# Patient Record
Sex: MALE | URBAN - METROPOLITAN AREA
[De-identification: names, ages, dates, MRNs, and addresses within clinical notes are randomized per-mention and may not be internally consistent; named-entity substitution may affect disease eponyms.]

---

## 2024-02-09 ENCOUNTER — LAB REQUISITION (OUTPATIENT)
Dept: LAB | Facility: CLINIC | Age: 59
End: 2024-02-09

## 2024-02-09 DIAGNOSIS — E78.5 HYPERLIPIDEMIA, UNSPECIFIED: ICD-10-CM

## 2024-02-09 DIAGNOSIS — Z12.5 ENCOUNTER FOR SCREENING FOR MALIGNANT NEOPLASM OF PROSTATE: ICD-10-CM

## 2024-02-09 PROCEDURE — G0103 PSA SCREENING: HCPCS | Performed by: STUDENT IN AN ORGANIZED HEALTH CARE EDUCATION/TRAINING PROGRAM

## 2024-02-09 PROCEDURE — 80061 LIPID PANEL: CPT | Performed by: STUDENT IN AN ORGANIZED HEALTH CARE EDUCATION/TRAINING PROGRAM

## 2024-02-09 PROCEDURE — 80053 COMPREHEN METABOLIC PANEL: CPT | Performed by: STUDENT IN AN ORGANIZED HEALTH CARE EDUCATION/TRAINING PROGRAM

## 2024-02-10 LAB
ALBUMIN SERPL BCG-MCNC: 4.4 G/DL (ref 3.5–5.2)
ALP SERPL-CCNC: 88 U/L (ref 40–150)
ALT SERPL W P-5'-P-CCNC: 46 U/L (ref 0–70)
ANION GAP SERPL CALCULATED.3IONS-SCNC: 13 MMOL/L (ref 7–15)
AST SERPL W P-5'-P-CCNC: 34 U/L (ref 0–45)
BILIRUB SERPL-MCNC: 0.6 MG/DL
BUN SERPL-MCNC: 10.3 MG/DL (ref 6–20)
CALCIUM SERPL-MCNC: 9.5 MG/DL (ref 8.6–10)
CHLORIDE SERPL-SCNC: 104 MMOL/L (ref 98–107)
CHOLEST SERPL-MCNC: 136 MG/DL
CREAT SERPL-MCNC: 1.01 MG/DL (ref 0.67–1.17)
DEPRECATED HCO3 PLAS-SCNC: 24 MMOL/L (ref 22–29)
EGFRCR SERPLBLD CKD-EPI 2021: 86 ML/MIN/1.73M2
FASTING STATUS PATIENT QL REPORTED: ABNORMAL
GLUCOSE SERPL-MCNC: 124 MG/DL (ref 70–99)
HDLC SERPL-MCNC: 35 MG/DL
LDLC SERPL CALC-MCNC: 69 MG/DL
NONHDLC SERPL-MCNC: 101 MG/DL
POTASSIUM SERPL-SCNC: 4.3 MMOL/L (ref 3.4–5.3)
PROT SERPL-MCNC: 7.3 G/DL (ref 6.4–8.3)
PSA SERPL DL<=0.01 NG/ML-MCNC: 0.36 NG/ML (ref 0–3.5)
SODIUM SERPL-SCNC: 141 MMOL/L (ref 135–145)
TRIGL SERPL-MCNC: 159 MG/DL

## 2025-05-05 ENCOUNTER — HOSPITAL ENCOUNTER (INPATIENT)
Facility: CLINIC | Age: 60
LOS: 2 days | Discharge: HOME | End: 2025-05-08
Attending: HOSPITALIST | Admitting: HOSPITALIST
Payer: COMMERCIAL

## 2025-05-05 ENCOUNTER — APPOINTMENT (OUTPATIENT)
Dept: MRI IMAGING | Facility: CLINIC | Age: 60
End: 2025-05-05
Attending: HOSPITALIST
Payer: COMMERCIAL

## 2025-05-05 ENCOUNTER — APPOINTMENT (OUTPATIENT)
Dept: ULTRASOUND IMAGING | Facility: HOSPITAL | Age: 60
End: 2025-05-05
Attending: EMERGENCY MEDICINE
Payer: COMMERCIAL

## 2025-05-05 ENCOUNTER — HOSPITAL ENCOUNTER (EMERGENCY)
Facility: HOSPITAL | Age: 60
Discharge: ANOTHER HOSPITAL | End: 2025-05-05
Attending: EMERGENCY MEDICINE | Admitting: EMERGENCY MEDICINE
Payer: COMMERCIAL

## 2025-05-05 VITALS
DIASTOLIC BLOOD PRESSURE: 68 MMHG | TEMPERATURE: 98.5 F | BODY MASS INDEX: 34.8 KG/M2 | OXYGEN SATURATION: 97 % | RESPIRATION RATE: 20 BRPM | HEIGHT: 69 IN | HEART RATE: 81 BPM | SYSTOLIC BLOOD PRESSURE: 122 MMHG | WEIGHT: 235 LBS

## 2025-05-05 DIAGNOSIS — L03.114 CELLULITIS OF LEFT UPPER EXTREMITY: ICD-10-CM

## 2025-05-05 DIAGNOSIS — L03.114 CELLULITIS OF LEFT UPPER EXTREMITY: Primary | ICD-10-CM

## 2025-05-05 PROBLEM — L03.90 CELLULITIS: Status: ACTIVE | Noted: 2025-05-05

## 2025-05-05 LAB
ANION GAP SERPL CALCULATED.3IONS-SCNC: 14 MMOL/L (ref 7–15)
BUN SERPL-MCNC: 11.1 MG/DL (ref 8–23)
CALCIUM SERPL-MCNC: 9.5 MG/DL (ref 8.8–10.4)
CHLORIDE SERPL-SCNC: 104 MMOL/L (ref 98–107)
CK SERPL-CCNC: 149 U/L (ref 39–308)
CREAT SERPL-MCNC: 1.07 MG/DL (ref 0.67–1.17)
CRP SERPL-MCNC: 154.1 MG/L
EGFRCR SERPLBLD CKD-EPI 2021: 80 ML/MIN/1.73M2
ERYTHROCYTE [DISTWIDTH] IN BLOOD BY AUTOMATED COUNT: 13.6 % (ref 10–15)
GLUCOSE SERPL-MCNC: 199 MG/DL (ref 70–99)
HCO3 SERPL-SCNC: 22 MMOL/L (ref 22–29)
HCT VFR BLD AUTO: 41.9 % (ref 40–53)
HGB BLD-MCNC: 14.2 G/DL (ref 13.3–17.7)
MCH RBC QN AUTO: 31.8 PG (ref 26.5–33)
MCHC RBC AUTO-ENTMCNC: 33.9 G/DL (ref 31.5–36.5)
MCV RBC AUTO: 94 FL (ref 78–100)
PLATELET # BLD AUTO: 211 10E3/UL (ref 150–450)
POTASSIUM SERPL-SCNC: 3.8 MMOL/L (ref 3.4–5.3)
RBC # BLD AUTO: 4.46 10E6/UL (ref 4.4–5.9)
SODIUM SERPL-SCNC: 140 MMOL/L (ref 135–145)
WBC # BLD AUTO: 20.5 10E3/UL (ref 4–11)

## 2025-05-05 PROCEDURE — 250N000011 HC RX IP 250 OP 636: Performed by: HOSPITALIST

## 2025-05-05 PROCEDURE — 250N000011 HC RX IP 250 OP 636: Performed by: EMERGENCY MEDICINE

## 2025-05-05 PROCEDURE — 82550 ASSAY OF CK (CPK): CPT | Performed by: EMERGENCY MEDICINE

## 2025-05-05 PROCEDURE — 90471 IMMUNIZATION ADMIN: CPT | Performed by: HOSPITALIST

## 2025-05-05 PROCEDURE — 255N000002 HC RX 255 OP 636: Performed by: HOSPITALIST

## 2025-05-05 PROCEDURE — A9585 GADOBUTROL INJECTION: HCPCS | Performed by: HOSPITALIST

## 2025-05-05 PROCEDURE — 86140 C-REACTIVE PROTEIN: CPT | Performed by: EMERGENCY MEDICINE

## 2025-05-05 PROCEDURE — 36415 COLL VENOUS BLD VENIPUNCTURE: CPT | Performed by: EMERGENCY MEDICINE

## 2025-05-05 PROCEDURE — 82310 ASSAY OF CALCIUM: CPT | Performed by: HOSPITALIST

## 2025-05-05 PROCEDURE — 73223 MRI JOINT UPR EXTR W/O&W/DYE: CPT | Mod: LT

## 2025-05-05 PROCEDURE — 93971 EXTREMITY STUDY: CPT | Mod: LT

## 2025-05-05 PROCEDURE — 258N000003 HC RX IP 258 OP 636: Performed by: HOSPITALIST

## 2025-05-05 PROCEDURE — 99222 1ST HOSP IP/OBS MODERATE 55: CPT | Performed by: HOSPITALIST

## 2025-05-05 PROCEDURE — 90715 TDAP VACCINE 7 YRS/> IM: CPT | Performed by: HOSPITALIST

## 2025-05-05 PROCEDURE — 96365 THER/PROPH/DIAG IV INF INIT: CPT

## 2025-05-05 PROCEDURE — 99285 EMERGENCY DEPT VISIT HI MDM: CPT | Mod: 25

## 2025-05-05 PROCEDURE — 250N000013 HC RX MED GY IP 250 OP 250 PS 637: Performed by: HOSPITALIST

## 2025-05-05 PROCEDURE — 120N000001 HC R&B MED SURG/OB

## 2025-05-05 PROCEDURE — 85018 HEMOGLOBIN: CPT | Performed by: EMERGENCY MEDICINE

## 2025-05-05 RX ORDER — ATENOLOL 25 MG/1
25 TABLET ORAL DAILY
Status: DISCONTINUED | OUTPATIENT
Start: 2025-05-06 | End: 2025-05-08 | Stop reason: HOSPADM

## 2025-05-05 RX ORDER — ATORVASTATIN CALCIUM 80 MG/1
40 TABLET, FILM COATED ORAL DAILY
COMMUNITY
Start: 2025-04-21

## 2025-05-05 RX ORDER — ASPIRIN 81 MG/1
81 TABLET ORAL DAILY
COMMUNITY

## 2025-05-05 RX ORDER — VANCOMYCIN HYDROCHLORIDE 1 G/200ML
1000 INJECTION, SOLUTION INTRAVENOUS EVERY 12 HOURS
Status: DISCONTINUED | OUTPATIENT
Start: 2025-05-06 | End: 2025-05-07

## 2025-05-05 RX ORDER — LORATADINE 10 MG/1
10 TABLET ORAL DAILY PRN
COMMUNITY

## 2025-05-05 RX ORDER — ASPIRIN 81 MG/1
81 TABLET ORAL DAILY
Status: DISCONTINUED | OUTPATIENT
Start: 2025-05-06 | End: 2025-05-08 | Stop reason: HOSPADM

## 2025-05-05 RX ORDER — ATORVASTATIN CALCIUM 20 MG/1
TABLET, FILM COATED ORAL DAILY
Status: ON HOLD | COMMUNITY
End: 2025-05-05

## 2025-05-05 RX ORDER — ATENOLOL 25 MG/1
25 TABLET ORAL DAILY
COMMUNITY

## 2025-05-05 RX ORDER — SILDENAFIL 25 MG/1
25 TABLET, FILM COATED ORAL SEE ADMIN INSTRUCTIONS
COMMUNITY
Start: 2024-10-28

## 2025-05-05 RX ORDER — ONDANSETRON 2 MG/ML
4 INJECTION INTRAMUSCULAR; INTRAVENOUS EVERY 6 HOURS PRN
Status: DISCONTINUED | OUTPATIENT
Start: 2025-05-05 | End: 2025-05-08 | Stop reason: HOSPADM

## 2025-05-05 RX ORDER — GADOBUTROL 604.72 MG/ML
10 INJECTION INTRAVENOUS ONCE
Status: COMPLETED | OUTPATIENT
Start: 2025-05-05 | End: 2025-05-05

## 2025-05-05 RX ORDER — CLINDAMYCIN PHOSPHATE 600 MG/50ML
600 INJECTION, SOLUTION INTRAVENOUS ONCE
Status: COMPLETED | OUTPATIENT
Start: 2025-05-05 | End: 2025-05-05

## 2025-05-05 RX ORDER — ONDANSETRON 4 MG/1
4 TABLET, ORALLY DISINTEGRATING ORAL EVERY 6 HOURS PRN
Status: DISCONTINUED | OUTPATIENT
Start: 2025-05-05 | End: 2025-05-08 | Stop reason: HOSPADM

## 2025-05-05 RX ORDER — AMOXICILLIN 250 MG
2 CAPSULE ORAL 2 TIMES DAILY PRN
Status: DISCONTINUED | OUTPATIENT
Start: 2025-05-05 | End: 2025-05-08 | Stop reason: HOSPADM

## 2025-05-05 RX ORDER — COVID-19 ANTIGEN TEST
220 KIT MISCELLANEOUS 2 TIMES DAILY PRN
COMMUNITY

## 2025-05-05 RX ORDER — ACETAMINOPHEN 325 MG/1
650 TABLET ORAL EVERY 6 HOURS PRN
Status: DISCONTINUED | OUTPATIENT
Start: 2025-05-05 | End: 2025-05-08 | Stop reason: HOSPADM

## 2025-05-05 RX ORDER — AMOXICILLIN 250 MG
1 CAPSULE ORAL 2 TIMES DAILY PRN
Status: DISCONTINUED | OUTPATIENT
Start: 2025-05-05 | End: 2025-05-08 | Stop reason: HOSPADM

## 2025-05-05 RX ORDER — ATORVASTATIN CALCIUM 40 MG/1
40 TABLET, FILM COATED ORAL DAILY
Status: DISCONTINUED | OUTPATIENT
Start: 2025-05-06 | End: 2025-05-08 | Stop reason: HOSPADM

## 2025-05-05 RX ADMIN — GADOBUTROL 10 ML: 604.72 INJECTION INTRAVENOUS at 21:57

## 2025-05-05 RX ADMIN — CLINDAMYCIN PHOSPHATE 600 MG: 600 INJECTION, SOLUTION INTRAVENOUS at 14:34

## 2025-05-05 RX ADMIN — ACETAMINOPHEN 650 MG: 325 TABLET, FILM COATED ORAL at 20:46

## 2025-05-05 RX ADMIN — CLOSTRIDIUM TETANI TOXOID ANTIGEN (FORMALDEHYDE INACTIVATED), CORYNEBACTERIUM DIPHTHERIAE TOXOID ANTIGEN (FORMALDEHYDE INACTIVATED), BORDETELLA PERTUSSIS TOXOID ANTIGEN (GLUTARALDEHYDE INACTIVATED), BORDETELLA PERTUSSIS FILAMENTOUS HEMAGGLUTININ ANTIGEN (FORMALDEHYDE INACTIVATED), BORDETELLA PERTUSSIS PERTACTIN ANTIGEN, AND BORDETELLA PERTUSSIS FIMBRIAE 2/3 ANTIGEN 0.5 ML: 5; 2; 2.5; 5; 3; 5 INJECTION, SUSPENSION INTRAMUSCULAR at 22:09

## 2025-05-05 RX ADMIN — VANCOMYCIN HYDROCHLORIDE 2000 MG: 5 INJECTION, POWDER, LYOPHILIZED, FOR SOLUTION INTRAVENOUS at 19:58

## 2025-05-05 ASSESSMENT — COLUMBIA-SUICIDE SEVERITY RATING SCALE - C-SSRS
6. HAVE YOU EVER DONE ANYTHING, STARTED TO DO ANYTHING, OR PREPARED TO DO ANYTHING TO END YOUR LIFE?: NO
1. IN THE PAST MONTH, HAVE YOU WISHED YOU WERE DEAD OR WISHED YOU COULD GO TO SLEEP AND NOT WAKE UP?: NO
2. HAVE YOU ACTUALLY HAD ANY THOUGHTS OF KILLING YOURSELF IN THE PAST MONTH?: NO
2. HAVE YOU ACTUALLY HAD ANY THOUGHTS OF KILLING YOURSELF IN THE PAST MONTH?: NO
1. IN THE PAST MONTH, HAVE YOU WISHED YOU WERE DEAD OR WISHED YOU COULD GO TO SLEEP AND NOT WAKE UP?: NO
6. HAVE YOU EVER DONE ANYTHING, STARTED TO DO ANYTHING, OR PREPARED TO DO ANYTHING TO END YOUR LIFE?: NO

## 2025-05-05 ASSESSMENT — ACTIVITIES OF DAILY LIVING (ADL)
ADLS_ACUITY_SCORE: 41
ADLS_ACUITY_SCORE: 41
ADLS_ACUITY_SCORE: 18
ADLS_ACUITY_SCORE: 41
ADLS_ACUITY_SCORE: 18
ADLS_ACUITY_SCORE: 41
ADLS_ACUITY_SCORE: 18
ADLS_ACUITY_SCORE: 41

## 2025-05-05 NOTE — ED TRIAGE NOTES
Pt fell last Friday and hit his left arm.  His arm hurt so he went in on Sunday and had an xray which was negative for a fracture.  Yesterday at 3pm ,his left arm was sore,tingling and he had a fever.  This am when he woke up his arm was warm, red and swollen.  It hurts 7/10 if  the arm is touched..

## 2025-05-05 NOTE — ED NOTES
Patient is being transferred to [facility].  Will leave IV in because patient will need IV antibiotics.

## 2025-05-05 NOTE — PROGRESS NOTES
Triage note    [facility] ED provider requesting transfer for admission for management of cellulitis.  Reason for transfer is lack of bed availability at [facility].  Patient accepted in transfer to [facility].

## 2025-05-05 NOTE — MEDICATION SCRIBE - ADMISSION MEDICATION HISTORY
Medication Scribe Admission Medication History    Admission medication history is complete. The information provided in this note is only as accurate as the sources available at the time of the update.    Information Source(s): Patient, Family member, and CareEverywhere/SureScripts via in-person    Pertinent Information: Patient took all Rx medications this morning including aspirin 81 mg.     Changes made to PTA medication list:  Added:   Naproxen 220 mg  Sildenafil 25 mg  Loratadine 10 mg  Deleted: None  Changed:   Atorvastatin 20 mg --> 1/2 x 80 mg (40 mg)    Allergies reviewed with patient and updates made in EHR: yes    Medication History Completed By: Eliseo Brown 5/5/2025 6:44 PM    PTA Med List   Medication Sig Last Dose/Taking    aspirin 81 MG EC tablet Take 81 mg by mouth daily. 5/5/2025 Morning    atenolol (TENORMIN) 25 MG tablet Take 25 mg by mouth daily. 5/5/2025 Morning    atorvastatin (LIPITOR) 80 MG tablet Take 40 mg by mouth daily. 5/5/2025 Morning    loratadine (CLARITIN) 10 MG tablet Take 10 mg by mouth daily as needed for allergies. 5/4/2025 Morning    naproxen sodium 220 MG capsule Take 220 mg by mouth 2 times daily as needed (pain). 5/4/2025 Morning    sildenafil (VIAGRA) 25 MG tablet Take 25 mg by mouth See Admin Instructions. Take as directed by your prescriber. 5/3/2025

## 2025-05-05 NOTE — H&P
"[facility]    History and Physical - Hospitalist Service       Date of Admission:  5/5/2025    Assessment & Plan      Balaji Jc is a 59 year old male presenting with skin and soft tissue infection in the left upper extremity.  Concern for involvement of the left elbow or associated bursitis given exam.  Further evaluation needed with MRI.  For now, treat empirically with IV vancomycin.  Orthopaedic consult pending as well.    Regarding prior notation of \"allergy\" to tetanus vaccination, symptoms of fever and malaise are more consistent with a side effect rather than a hypersensitivity reaction.  Therefore, proceed with vaccination given laceration from sheet metal.    # LUE skin/soft tissue infection, concern for septic arthritis  - empiric vancomycin  - MRI L elbow  - ortho consult    # Left hand laceration  - Tdap vaccination    # Ectopy  - atenolol              Diet: Regular Diet Adult  NPO for Medical/Clinical Reasons Except for: Meds  Felix Catheter: Not present  Lines: None     Cardiac Monitoring: None  Code Status: No CPR- Do NOT Intubate    Clinically Significant Risk Factors Present on Admission                 # Drug Induced Platelet Defect: home medication list includes an antiplatelet medication             # Obesity: Estimated body mass index is 34.7 kg/m  as calculated from the following:    Height as of an earlier encounter on 5/5/25: 1.753 m (5' 9\").    Weight as of an earlier encounter on 5/5/25: 106.6 kg (235 lb).              Disposition Plan     Medically Ready for Discharge: Anticipated Tomorrow           Donis Kumar MD  Hospitalist Service  [facility]  Securely message with [employer] (more info)  Text page via [employer] Paging/Directory     ______________________________________________________________________    Chief Complaint   Left arm swelling    History is obtained from the patient and spouse    History of Present Illness   Balaji ALEMAN" Hosea is a 59 year old male who presents for evaluation of left arm swelling.  He was in his usual state of health until three days ago.  He fell off a ladder at that time and hit his left elbow.  He also cut his left hand with metal that same day.  Two days ago, he felt normal and was working.  Yesterday, he developed tingling and pain at the left elbow.  He had a fever to 101.6 as well.  Today, he developed a new rash from the left upper arm to the hand.  The rash has not gotten worse over the course of the day.  The swelling, however, has progressed over the course of the day.    Denies chest pain, chest pressure, dyspnea, nausea, or vomiting.  He went to [place] urgent care this morning and states he had radiographs taken at that time.    He reports having a prior reaction to a tetanus vaccination in 1995.  He had fever and malaise.  Denied associated dyspnea or rash.    I communicated directly with the ED provider at [facility] regarding this patient's presentation.    Past Medical History    No past medical history on file.    Past Surgical History   No past surgical history on file.    Prior to Admission Medications   Prior to Admission Medications   Prescriptions Last Dose Informant Patient Reported? Taking?   aspirin 81 MG EC tablet   Yes Yes   Sig: Take 81 mg by mouth daily.   atenolol (TENORMIN) 25 MG tablet   Yes Yes   Sig: Take 25 mg by mouth daily.   atorvastatin (LIPITOR) 20 MG tablet   Yes Yes   Sig: Take by mouth daily.      Facility-Administered Medications: None           Physical Exam   Vital Signs:                    Weight: 0 lbs 0 oz    Gen:  sitting in bed in no extremis  Neuro:  alert, conversant  CV:  nl rate, regular rhythm  Pulm:  no acute resp distress, ctab  MSK:  left upper extremity with extensive confluent erythema extending from the upper arm through the dorsum of the hand; there is not able swelling, warmth, and tenderness overlying the left elbow with associated fluid collection;  mild tenderness to palpation at the left wrist with patient demonstrating flexion/extension at the left wrist; healing laceration on palmar surface of left hand    Medical Decision Making             Data   Reviewed:    WBC 21  Hgb 14  Plts 211    LUE US  1.  No deep venous thrombosis in the left upper extremity.     2.  Moderate heterogeneous subcutaneous edema and/or hematoma formation, pronounced along the posterior forearm.

## 2025-05-05 NOTE — ED PROVIDER NOTES
EMERGENCY DEPARTMENT ENCOUNTER      NAME: Balaji Jc  AGE: 59 year old male  YOB: 1965  MRN: [id number]  EVALUATION DATE & TIME: 5/5/2025  1:08 PM    PCP: No primary care provider on file.    ED PROVIDER: Geraldo Schwartz M.D.      Chief Complaint   Patient presents with    Arm Pain         FINAL IMPRESSION:  1. Cellulitis of left upper extremity          ED COURSE & MEDICAL DECISION MAKING:    Pertinent Labs & Imaging studies reviewed. (See chart for details)  59 year old male presents to the Emergency Department for evaluation of left arm pain.  Patient reports falling from the bottom rung of the ladder onto a gravel surface on Friday.  Patient had slight immediate discomfort.  Was seen in urgent care and had x-rays performed which were negative.  He states that he has been using the arm regularly until today when it suddenly became more painful, swollen and reddened.  Patient denies any fevers or chills.  Exam reveals diffuse swelling of the left arm starting at the mid biceps area and extending distally.  Patient with induration along the posterior lateral aspect with erythema that is markedly tender proximally.  Elbow is supple without evidence of significant discomfort with movement.  No evidence joint infection.  Will obtain blood work and a inflammatory markers over concerns of infection.  Also obtained ultrasound over concerns of DVT.  Patient with ring in place which he removed immediately to avoid tourniquet effect.  Patient appears non toxic with stable vitals signs. Overall exam is benign.      1:11 PM I met with the patient for the initial interview and physical examination. Discussed plan for treatment and workup in the ED.    2:08 PM.  CRP markedly elevated 154.  White cell count elevated at 20.5.  CPK normal at 149.  Patient likely with significant cellulitic process.  Will initiate antibiotics and plan on hospitalization given rapid advancement.  2:40 PM.  Patient has bed available  at [facility].  Awaiting call from hospitalist.  Plan will be for patient to transfer by POV.  Ultrasound without evidence of DVT.  3:05 PM.  Patient discussed with my associate end of shift.  Patient awaiting transfer to [facility].  3:17 PM.  Patient accepted by Dr. Kumar the hospitalist at [facility].  At the conclusion of the encounter I discussed the results of all of the tests and the disposition. The questions were answered and return precautions provided. The patient or family acknowledged understanding and was agreeable with the care plan.         MEDICATIONS GIVEN IN THE EMERGENCY:  Medications   clindamycin (CLEOCIN) 600 mg in 50 mL D5W intermittent infusion (has no administration in time range)       NEW PRESCRIPTIONS STARTED AT TODAY'S ER VISIT  New Prescriptions    No medications on file          =================================================================    HPI      Balaji Jc is a 59 year old male with a pertient medical history of contact dermatitis, who presents to the ED for evaluation of left arm pain and swelling.    The patient reports that he was using a ladder and was going down the ladder, however, he notes that he forgot that the second to last step of the ladder was missing, so he fell as he was walking down the ladder. He states that he fell down onto the soft gravel ground. This fall occurred this past Friday (on 05/02/2025). Notes that he was not in any pain and nothing was bothering him after he fell that day, hence he continued to work. He reports that he was feeling fine until today (05/05/2025) noting that he started experiencing pain all of a sudden today. His left arm appears very swollen and tender. He indicates that he began having slight pain radiating up starting from his left elbow.     Per RN report, in triage the patient mentioned that his left arm began to hurt after this fall, so he went in on Sunday and had an x-ray which was negative  "for a fracture. Yesterday (05/04/2025) at 3 PM, the patient's left arm was sore, tingling and he had a fever. This morning, his arm was warm, red and swollen when he woke up. He mentions that it hurts 7/10 if the arm is touched. No additional complaints or concerns reported at this time.      REVIEW OF SYSTEMS   Refer to HPI, otherwise negative    PAST MEDICAL HISTORY:  No past medical history on file.    PAST SURGICAL HISTORY:  No past surgical history on file.      CURRENT MEDICATIONS:      Current Facility-Administered Medications:     clindamycin (CLEOCIN) 600 mg in 50 mL D5W intermittent infusion, 600 mg, Intravenous, Once, Geraldo Schwartz MD  No current outpatient medications on file.    ALLERGIES:  Allergies   Allergen Reactions    Tetanus Toxoid        FAMILY HISTORY:  No family history on file.    SOCIAL HISTORY:   Social History     Socioeconomic History    Marital status: Single     Social Drivers of Health      Received from [facility] [employer] [facility]    Financial Resource Strain       VITALS:  Patient Vitals for the past 24 hrs:   BP Temp Temp src Pulse Resp SpO2 Height Weight   05/05/25 1315 (!) 143/82 -- -- 87 -- 96 % -- --   05/05/25 1305 (!) 183/98 98.5  F (36.9  C) Tympanic 98 20 96 % 1.753 m (5' 9\") 106.6 kg (235 lb)        PHYSICAL EXAM    Constitutional:  Awake, alert, in no apparent distress  HENT:  Normocephalic, Atraumatic. Bilateral external ears normal. Oropharynx moist. Nose normal. Neck- Normal range of motion with no guarding, Supple, No stridor.   Eyes:  PERRL, EOMI with no signs of entrapment, Conjunctiva normal, No discharge.   Respiratory:  Normal breath sounds, No respiratory distress, No wheezing.    Cardiovascular:  Normal heart rate, Normal rhythm, No appreciable rubs or gallops.   GI:  Soft, No tenderness, No distension, No palpable masses  Musculoskeletal: Good range of motion in all major joints. No major deformities noted. Left arm is significantly " edematous from the mid biceps area and extending distally. Induration along the posterior lateral aspect. Erythematosus along the entire posterior lateral aspect of the left arm and forearm with marked tenderness distally.  Integument:  Warm, Dry, No erythema, No rash.   Neurologic:  Alert & oriented, Normal motor function, Normal sensory function, No focal deficits noted.   Psychiatric:  Affect normal, Judgment normal, Mood normal.     LAB:  All pertinent labs reviewed and interpreted.  Results for orders placed or performed during the hospital encounter of 05/05/25   US Upper Extremity Venous Duplex Left    Impression    IMPRESSION:     1.  No deep venous thrombosis in the left upper extremity.    2.  Moderate heterogeneous subcutaneous edema and/or hematoma formation, pronounced along the posterior forearm.   Result Value Ref Range    CRP Inflammation 154.10 (H) <5.00 mg/L   CBC (+ platelets, no diff)   Result Value Ref Range    WBC Count 20.5 (H) 4.0 - 11.0 10e3/uL    RBC Count 4.46 4.40 - 5.90 10e6/uL    Hemoglobin 14.2 13.3 - 17.7 g/dL    Hematocrit 41.9 40.0 - 53.0 %    MCV 94 78 - 100 fL    MCH 31.8 26.5 - 33.0 pg    MCHC 33.9 31.5 - 36.5 g/dL    RDW 13.6 10.0 - 15.0 %    Platelet Count 211 150 - 450 10e3/uL   Result Value Ref Range     39 - 308 U/L       RADIOLOGY:  Reviewed all pertinent imaging. Please see official radiology report.  US Upper Extremity Venous Duplex Left   Final Result   IMPRESSION:       1.  No deep venous thrombosis in the left upper extremity.      2.  Moderate heterogeneous subcutaneous edema and/or hematoma formation, pronounced along the posterior forearm.          EKG:    N/A    PROCEDURES:   N/A       I, Kateryna Conway, am serving as a scribe to document services personally performed by Geraldo Schwartz MD, based on my observation and the provider's statements to me. I, Geraldo Schwartz MD attest that Kateryna Conway is acting in a scribe capacity, has observed my performance of the  services and has documented them in accordance with my direction.    Geraldo Schwartz M.D.  Emergency Medicine  [facility] EMERGENCY DEPARTMENT      Geraldo Schwartz MD  05/05/25 1506       Geraldo Schwartz MD  05/05/25 151

## 2025-05-06 ENCOUNTER — ANESTHESIA (OUTPATIENT)
Dept: SURGERY | Facility: CLINIC | Age: 60
End: 2025-05-06
Payer: COMMERCIAL

## 2025-05-06 ENCOUNTER — ANESTHESIA EVENT (OUTPATIENT)
Dept: SURGERY | Facility: CLINIC | Age: 60
End: 2025-05-06
Payer: COMMERCIAL

## 2025-05-06 PROBLEM — L03.114 CELLULITIS OF LEFT UPPER EXTREMITY: Status: ACTIVE | Noted: 2025-05-06

## 2025-05-06 LAB
ANION GAP SERPL CALCULATED.3IONS-SCNC: 12 MMOL/L (ref 7–15)
BACTERIA SPEC CULT: NORMAL
BACTERIA SPEC CULT: NORMAL
BUN SERPL-MCNC: 10.9 MG/DL (ref 8–23)
CALCIUM SERPL-MCNC: 8.9 MG/DL (ref 8.8–10.4)
CHLORIDE SERPL-SCNC: 106 MMOL/L (ref 98–107)
CREAT SERPL-MCNC: 1.09 MG/DL (ref 0.67–1.17)
EGFRCR SERPLBLD CKD-EPI 2021: 78 ML/MIN/1.73M2
ERYTHROCYTE [DISTWIDTH] IN BLOOD BY AUTOMATED COUNT: 13.8 % (ref 10–15)
GLUCOSE SERPL-MCNC: 116 MG/DL (ref 70–99)
GRAM STAIN RESULT: NORMAL
HCO3 SERPL-SCNC: 22 MMOL/L (ref 22–29)
HCT VFR BLD AUTO: 39.6 % (ref 40–53)
HGB BLD-MCNC: 13.6 G/DL (ref 13.3–17.7)
MCH RBC QN AUTO: 32.3 PG (ref 26.5–33)
MCHC RBC AUTO-ENTMCNC: 34.3 G/DL (ref 31.5–36.5)
MCV RBC AUTO: 94 FL (ref 78–100)
PLATELET # BLD AUTO: 205 10E3/UL (ref 150–450)
POTASSIUM SERPL-SCNC: 4.3 MMOL/L (ref 3.4–5.3)
RBC # BLD AUTO: 4.21 10E6/UL (ref 4.4–5.9)
SODIUM SERPL-SCNC: 140 MMOL/L (ref 135–145)
WBC # BLD AUTO: 16.6 10E3/UL (ref 4–11)

## 2025-05-06 PROCEDURE — 710N000010 HC RECOVERY PHASE 1, LEVEL 2, PER MIN: Performed by: ORTHOPAEDIC SURGERY

## 2025-05-06 PROCEDURE — 258N000003 HC RX IP 258 OP 636: Performed by: ANESTHESIOLOGY

## 2025-05-06 PROCEDURE — 82310 ASSAY OF CALCIUM: CPT | Performed by: HOSPITALIST

## 2025-05-06 PROCEDURE — 250N000011 HC RX IP 250 OP 636: Performed by: NURSE ANESTHETIST, CERTIFIED REGISTERED

## 2025-05-06 PROCEDURE — 99232 SBSQ HOSP IP/OBS MODERATE 35: CPT | Performed by: HOSPITALIST

## 2025-05-06 PROCEDURE — 258N000003 HC RX IP 258 OP 636: Performed by: NURSE ANESTHETIST, CERTIFIED REGISTERED

## 2025-05-06 PROCEDURE — 250N000011 HC RX IP 250 OP 636: Performed by: HOSPITALIST

## 2025-05-06 PROCEDURE — 250N000011 HC RX IP 250 OP 636: Mod: JZ | Performed by: ANESTHESIOLOGY

## 2025-05-06 PROCEDURE — 272N000001 HC OR GENERAL SUPPLY STERILE: Performed by: ORTHOPAEDIC SURGERY

## 2025-05-06 PROCEDURE — 250N000011 HC RX IP 250 OP 636: Performed by: PHYSICIAN ASSISTANT

## 2025-05-06 PROCEDURE — 85027 COMPLETE CBC AUTOMATED: CPT | Performed by: HOSPITALIST

## 2025-05-06 PROCEDURE — 370N000017 HC ANESTHESIA TECHNICAL FEE, PER MIN: Performed by: ORTHOPAEDIC SURGERY

## 2025-05-06 PROCEDURE — 0M940ZZ DRAINAGE OF LEFT ELBOW BURSA AND LIGAMENT, OPEN APPROACH: ICD-10-PCS | Performed by: ORTHOPAEDIC SURGERY

## 2025-05-06 PROCEDURE — 87205 SMEAR GRAM STAIN: CPT | Performed by: ORTHOPAEDIC SURGERY

## 2025-05-06 PROCEDURE — 250N000011 HC RX IP 250 OP 636

## 2025-05-06 PROCEDURE — 258N000001 HC RX 258: Performed by: PHYSICIAN ASSISTANT

## 2025-05-06 PROCEDURE — 36415 COLL VENOUS BLD VENIPUNCTURE: CPT | Performed by: HOSPITALIST

## 2025-05-06 PROCEDURE — 999N000141 HC STATISTIC PRE-PROCEDURE NURSING ASSESSMENT: Performed by: ORTHOPAEDIC SURGERY

## 2025-05-06 PROCEDURE — 360N000075 HC SURGERY LEVEL 2, PER MIN: Performed by: ORTHOPAEDIC SURGERY

## 2025-05-06 PROCEDURE — 120N000001 HC R&B MED SURG/OB

## 2025-05-06 PROCEDURE — 250N000013 HC RX MED GY IP 250 OP 250 PS 637: Performed by: HOSPITALIST

## 2025-05-06 PROCEDURE — 250N000009 HC RX 250: Performed by: NURSE ANESTHETIST, CERTIFIED REGISTERED

## 2025-05-06 PROCEDURE — 250N000011 HC RX IP 250 OP 636: Performed by: ANESTHESIOLOGY

## 2025-05-06 PROCEDURE — 87075 CULTR BACTERIA EXCEPT BLOOD: CPT | Performed by: ORTHOPAEDIC SURGERY

## 2025-05-06 PROCEDURE — 87070 CULTURE OTHR SPECIMN AEROBIC: CPT | Performed by: ORTHOPAEDIC SURGERY

## 2025-05-06 RX ORDER — ONDANSETRON 2 MG/ML
INJECTION INTRAMUSCULAR; INTRAVENOUS PRN
Status: DISCONTINUED | OUTPATIENT
Start: 2025-05-06 | End: 2025-05-06

## 2025-05-06 RX ORDER — SODIUM CHLORIDE, SODIUM LACTATE, POTASSIUM CHLORIDE, CALCIUM CHLORIDE 600; 310; 30; 20 MG/100ML; MG/100ML; MG/100ML; MG/100ML
INJECTION, SOLUTION INTRAVENOUS CONTINUOUS
Status: DISCONTINUED | OUTPATIENT
Start: 2025-05-06 | End: 2025-05-06 | Stop reason: HOSPADM

## 2025-05-06 RX ORDER — ONDANSETRON 4 MG/1
4 TABLET, ORALLY DISINTEGRATING ORAL EVERY 30 MIN PRN
Status: DISCONTINUED | OUTPATIENT
Start: 2025-05-06 | End: 2025-05-06 | Stop reason: HOSPADM

## 2025-05-06 RX ORDER — OXYCODONE HYDROCHLORIDE 5 MG/1
5 TABLET ORAL EVERY 4 HOURS PRN
Status: DISCONTINUED | OUTPATIENT
Start: 2025-05-06 | End: 2025-05-08 | Stop reason: HOSPADM

## 2025-05-06 RX ORDER — OXYCODONE HYDROCHLORIDE 5 MG/1
5 TABLET ORAL
Status: CANCELLED | OUTPATIENT
Start: 2025-05-06

## 2025-05-06 RX ORDER — NALOXONE HYDROCHLORIDE 0.4 MG/ML
0.2 INJECTION, SOLUTION INTRAMUSCULAR; INTRAVENOUS; SUBCUTANEOUS
Status: DISCONTINUED | OUTPATIENT
Start: 2025-05-06 | End: 2025-05-08 | Stop reason: HOSPADM

## 2025-05-06 RX ORDER — DEXAMETHASONE SODIUM PHOSPHATE 4 MG/ML
INJECTION, SOLUTION INTRA-ARTICULAR; INTRALESIONAL; INTRAMUSCULAR; INTRAVENOUS; SOFT TISSUE PRN
Status: DISCONTINUED | OUTPATIENT
Start: 2025-05-06 | End: 2025-05-06

## 2025-05-06 RX ORDER — NALOXONE HYDROCHLORIDE 0.4 MG/ML
0.1 INJECTION, SOLUTION INTRAMUSCULAR; INTRAVENOUS; SUBCUTANEOUS
Status: CANCELLED | OUTPATIENT
Start: 2025-05-06

## 2025-05-06 RX ORDER — PROPOFOL 10 MG/ML
INJECTION, EMULSION INTRAVENOUS CONTINUOUS PRN
Status: DISCONTINUED | OUTPATIENT
Start: 2025-05-06 | End: 2025-05-06

## 2025-05-06 RX ORDER — CEFAZOLIN SODIUM/WATER 2 G/20 ML
2 SYRINGE (ML) INTRAVENOUS SEE ADMIN INSTRUCTIONS
Status: DISCONTINUED | OUTPATIENT
Start: 2025-05-06 | End: 2025-05-06 | Stop reason: HOSPADM

## 2025-05-06 RX ORDER — CEFAZOLIN SODIUM/WATER 2 G/20 ML
2 SYRINGE (ML) INTRAVENOUS
Status: COMPLETED | OUTPATIENT
Start: 2025-05-06 | End: 2025-05-06

## 2025-05-06 RX ORDER — BUPIVACAINE HYDROCHLORIDE 5 MG/ML
INJECTION, SOLUTION EPIDURAL; INTRACAUDAL; PERINEURAL
Status: COMPLETED | OUTPATIENT
Start: 2025-05-06 | End: 2025-05-06

## 2025-05-06 RX ORDER — FENTANYL CITRATE 50 UG/ML
25 INJECTION, SOLUTION INTRAMUSCULAR; INTRAVENOUS EVERY 5 MIN PRN
Status: DISCONTINUED | OUTPATIENT
Start: 2025-05-06 | End: 2025-05-06 | Stop reason: HOSPADM

## 2025-05-06 RX ORDER — ONDANSETRON 4 MG/1
4 TABLET, ORALLY DISINTEGRATING ORAL EVERY 30 MIN PRN
Status: CANCELLED | OUTPATIENT
Start: 2025-05-06

## 2025-05-06 RX ORDER — FENTANYL CITRATE 50 UG/ML
100 INJECTION, SOLUTION INTRAMUSCULAR; INTRAVENOUS
Status: DISCONTINUED | OUTPATIENT
Start: 2025-05-06 | End: 2025-05-06 | Stop reason: HOSPADM

## 2025-05-06 RX ORDER — ONDANSETRON 2 MG/ML
4 INJECTION INTRAMUSCULAR; INTRAVENOUS EVERY 6 HOURS PRN
Status: DISCONTINUED | OUTPATIENT
Start: 2025-05-06 | End: 2025-05-08 | Stop reason: HOSPADM

## 2025-05-06 RX ORDER — PROCHLORPERAZINE MALEATE 10 MG
10 TABLET ORAL EVERY 6 HOURS PRN
Status: DISCONTINUED | OUTPATIENT
Start: 2025-05-06 | End: 2025-05-08 | Stop reason: HOSPADM

## 2025-05-06 RX ORDER — LIDOCAINE 40 MG/G
CREAM TOPICAL
Status: DISCONTINUED | OUTPATIENT
Start: 2025-05-06 | End: 2025-05-06 | Stop reason: HOSPADM

## 2025-05-06 RX ORDER — NALOXONE HYDROCHLORIDE 0.4 MG/ML
0.4 INJECTION, SOLUTION INTRAMUSCULAR; INTRAVENOUS; SUBCUTANEOUS
Status: DISCONTINUED | OUTPATIENT
Start: 2025-05-06 | End: 2025-05-08 | Stop reason: HOSPADM

## 2025-05-06 RX ORDER — DEXAMETHASONE SODIUM PHOSPHATE 10 MG/ML
4 INJECTION, SOLUTION INTRAMUSCULAR; INTRAVENOUS
Status: CANCELLED | OUTPATIENT
Start: 2025-05-06

## 2025-05-06 RX ORDER — ONDANSETRON 2 MG/ML
4 INJECTION INTRAMUSCULAR; INTRAVENOUS EVERY 30 MIN PRN
Status: CANCELLED | OUTPATIENT
Start: 2025-05-06

## 2025-05-06 RX ORDER — FENTANYL CITRATE 50 UG/ML
50 INJECTION, SOLUTION INTRAMUSCULAR; INTRAVENOUS
Status: DISCONTINUED | OUTPATIENT
Start: 2025-05-06 | End: 2025-05-06 | Stop reason: HOSPADM

## 2025-05-06 RX ORDER — HYDROMORPHONE HCL IN WATER/PF 6 MG/30 ML
0.4 PATIENT CONTROLLED ANALGESIA SYRINGE INTRAVENOUS EVERY 5 MIN PRN
Status: DISCONTINUED | OUTPATIENT
Start: 2025-05-06 | End: 2025-05-06 | Stop reason: HOSPADM

## 2025-05-06 RX ORDER — LIDOCAINE 40 MG/G
CREAM TOPICAL
Status: DISCONTINUED | OUTPATIENT
Start: 2025-05-06 | End: 2025-05-08 | Stop reason: HOSPADM

## 2025-05-06 RX ORDER — OXYCODONE HYDROCHLORIDE 5 MG/1
10 TABLET ORAL EVERY 4 HOURS PRN
Status: DISCONTINUED | OUTPATIENT
Start: 2025-05-06 | End: 2025-05-08 | Stop reason: HOSPADM

## 2025-05-06 RX ORDER — BISACODYL 10 MG
10 SUPPOSITORY, RECTAL RECTAL DAILY PRN
Status: DISCONTINUED | OUTPATIENT
Start: 2025-05-09 | End: 2025-05-08 | Stop reason: HOSPADM

## 2025-05-06 RX ORDER — LIDOCAINE HYDROCHLORIDE 10 MG/ML
INJECTION, SOLUTION INFILTRATION; PERINEURAL PRN
Status: DISCONTINUED | OUTPATIENT
Start: 2025-05-06 | End: 2025-05-06

## 2025-05-06 RX ORDER — NALOXONE HYDROCHLORIDE 0.4 MG/ML
0.1 INJECTION, SOLUTION INTRAMUSCULAR; INTRAVENOUS; SUBCUTANEOUS
Status: DISCONTINUED | OUTPATIENT
Start: 2025-05-06 | End: 2025-05-06 | Stop reason: HOSPADM

## 2025-05-06 RX ORDER — DEXAMETHASONE SODIUM PHOSPHATE 4 MG/ML
4 INJECTION, SOLUTION INTRA-ARTICULAR; INTRALESIONAL; INTRAMUSCULAR; INTRAVENOUS; SOFT TISSUE
Status: DISCONTINUED | OUTPATIENT
Start: 2025-05-06 | End: 2025-05-06 | Stop reason: HOSPADM

## 2025-05-06 RX ORDER — AMOXICILLIN 250 MG
1 CAPSULE ORAL 2 TIMES DAILY
Status: DISCONTINUED | OUTPATIENT
Start: 2025-05-06 | End: 2025-05-08 | Stop reason: HOSPADM

## 2025-05-06 RX ORDER — PROPOFOL 10 MG/ML
INJECTION, EMULSION INTRAVENOUS PRN
Status: DISCONTINUED | OUTPATIENT
Start: 2025-05-06 | End: 2025-05-06

## 2025-05-06 RX ORDER — FENTANYL CITRATE 50 UG/ML
50 INJECTION, SOLUTION INTRAMUSCULAR; INTRAVENOUS EVERY 5 MIN PRN
Status: DISCONTINUED | OUTPATIENT
Start: 2025-05-06 | End: 2025-05-06 | Stop reason: HOSPADM

## 2025-05-06 RX ORDER — ONDANSETRON 4 MG/1
4 TABLET, ORALLY DISINTEGRATING ORAL EVERY 6 HOURS PRN
Status: DISCONTINUED | OUTPATIENT
Start: 2025-05-06 | End: 2025-05-08 | Stop reason: HOSPADM

## 2025-05-06 RX ORDER — POLYETHYLENE GLYCOL 3350 17 G/17G
17 POWDER, FOR SOLUTION ORAL DAILY
Status: DISCONTINUED | OUTPATIENT
Start: 2025-05-07 | End: 2025-05-08 | Stop reason: HOSPADM

## 2025-05-06 RX ORDER — OXYCODONE HYDROCHLORIDE 5 MG/1
10 TABLET ORAL
Status: CANCELLED | OUTPATIENT
Start: 2025-05-06

## 2025-05-06 RX ORDER — HYDROMORPHONE HCL IN WATER/PF 6 MG/30 ML
0.2 PATIENT CONTROLLED ANALGESIA SYRINGE INTRAVENOUS EVERY 5 MIN PRN
Status: DISCONTINUED | OUTPATIENT
Start: 2025-05-06 | End: 2025-05-06 | Stop reason: HOSPADM

## 2025-05-06 RX ORDER — ONDANSETRON 2 MG/ML
4 INJECTION INTRAMUSCULAR; INTRAVENOUS EVERY 30 MIN PRN
Status: DISCONTINUED | OUTPATIENT
Start: 2025-05-06 | End: 2025-05-06 | Stop reason: HOSPADM

## 2025-05-06 RX ADMIN — LIDOCAINE HYDROCHLORIDE 5 ML: 10 INJECTION, SOLUTION INFILTRATION; PERINEURAL at 15:57

## 2025-05-06 RX ADMIN — DEXAMETHASONE SODIUM PHOSPHATE 4 MG: 4 INJECTION, SOLUTION INTRA-ARTICULAR; INTRALESIONAL; INTRAMUSCULAR; INTRAVENOUS; SOFT TISSUE at 15:53

## 2025-05-06 RX ADMIN — PROPOFOL 150 MCG/KG/MIN: 10 INJECTION, EMULSION INTRAVENOUS at 15:57

## 2025-05-06 RX ADMIN — ATENOLOL 25 MG: 25 TABLET ORAL at 08:35

## 2025-05-06 RX ADMIN — MIDAZOLAM HYDROCHLORIDE 2 MG: 1 INJECTION, SOLUTION INTRAMUSCULAR; INTRAVENOUS at 15:13

## 2025-05-06 RX ADMIN — ATORVASTATIN CALCIUM 40 MG: 40 TABLET, FILM COATED ORAL at 08:35

## 2025-05-06 RX ADMIN — SODIUM CHLORIDE, SODIUM LACTATE, POTASSIUM CHLORIDE, AND CALCIUM CHLORIDE: .6; .31; .03; .02 INJECTION, SOLUTION INTRAVENOUS at 13:51

## 2025-05-06 RX ADMIN — DEXMEDETOMIDINE HYDROCHLORIDE 12 MCG: 100 INJECTION, SOLUTION INTRAVENOUS at 15:57

## 2025-05-06 RX ADMIN — FENTANYL CITRATE 100 MCG: 50 INJECTION INTRAMUSCULAR; INTRAVENOUS at 15:12

## 2025-05-06 RX ADMIN — ASPIRIN 81 MG: 81 TABLET, COATED ORAL at 08:35

## 2025-05-06 RX ADMIN — VANCOMYCIN HYDROCHLORIDE 1000 MG: 1 INJECTION, SOLUTION INTRAVENOUS at 19:25

## 2025-05-06 RX ADMIN — ACETAMINOPHEN 650 MG: 325 TABLET, FILM COATED ORAL at 08:39

## 2025-05-06 RX ADMIN — VANCOMYCIN HYDROCHLORIDE 1000 MG: 1 INJECTION, SOLUTION INTRAVENOUS at 08:35

## 2025-05-06 RX ADMIN — PROPOFOL 30 MG: 10 INJECTION, EMULSION INTRAVENOUS at 15:57

## 2025-05-06 RX ADMIN — ONDANSETRON 4 MG: 2 INJECTION INTRAMUSCULAR; INTRAVENOUS at 16:00

## 2025-05-06 RX ADMIN — Medication 2 G: at 15:53

## 2025-05-06 RX ADMIN — BUPIVACAINE HYDROCHLORIDE 30 ML: 5 INJECTION, SOLUTION EPIDURAL; INTRACAUDAL at 15:15

## 2025-05-06 ASSESSMENT — ACTIVITIES OF DAILY LIVING (ADL)
ADLS_ACUITY_SCORE: 19
ADLS_ACUITY_SCORE: 23
ADLS_ACUITY_SCORE: 19
ADLS_ACUITY_SCORE: 23
ADLS_ACUITY_SCORE: 19
ADLS_ACUITY_SCORE: 18
ADLS_ACUITY_SCORE: 18
ADLS_ACUITY_SCORE: 23
ADLS_ACUITY_SCORE: 21
ADLS_ACUITY_SCORE: 21
ADLS_ACUITY_SCORE: 23
ADLS_ACUITY_SCORE: 21
ADLS_ACUITY_SCORE: 19
ADLS_ACUITY_SCORE: 23
ADLS_ACUITY_SCORE: 18
ADLS_ACUITY_SCORE: 18
ADLS_ACUITY_SCORE: 19
ADLS_ACUITY_SCORE: 23
ADLS_ACUITY_SCORE: 19
ADLS_ACUITY_SCORE: 19
ADLS_ACUITY_SCORE: 23
ADLS_ACUITY_SCORE: 19
ADLS_ACUITY_SCORE: 19

## 2025-05-06 NOTE — PROGRESS NOTES
"[facility]    Medicine Progress Note - Hospitalist Service    Date of Admission:  5/5/2025    Assessment & Plan      Balaji Jc is a 59 year old male admitted with LUE skin and soft tissue infection.  He is clinically stable, afebrile.  Significant improvement overnight in erythema in LUE. Awaiting MRI results to assess for septic arthritis or bursitis. Continue empiric vancomycin.    Patient tolerated tetanus vaccine.    # LUE skin/soft tissue infection, concern for septic arthritis  - empiric vancomycin  - MRI L elbow  - ortho consult    # Left hand laceration    # Ectopy  - atenolol              Diet: NPO for Medical/Clinical Reasons Except for: Meds    Felix Catheter: Not present  Lines: None     Cardiac Monitoring: None  Code Status: Full Code      Clinically Significant Risk Factors Present on Admission                 # Drug Induced Platelet Defect: home medication list includes an antiplatelet medication             # Obesity: Estimated body mass index is 34.79 kg/m  as calculated from the following:    Height as of this encounter: 1.753 m (5' 9\").    Weight as of this encounter: 106.9 kg (235 lb 9.6 oz).              Social Drivers of Health    Food Insecurity: High Risk (5/5/2025)    Food Insecurity     Within the past 12 months, did you worry that your food would run out before you got money to buy more?: Yes     Within the past 12 months, did the food you bought just not last and you didn t have money to get more?: No          Disposition Plan     Medically Ready for Discharge: Anticipated Today             Donis Kumar MD  Hospitalist Service  [facility]  Securely message with [employer] (more info)  Text page via [employer] Paging/Directory   ______________________________________________________________________    Interval History   Denies tingling in the left arm.  Pain has improved.    Physical Exam   Vital Signs: Temp: 98.9  F (37.2  C) Temp src: " Oral BP: 119/71 Pulse: 79   Resp: 18 SpO2: 94 % O2 Device: None (Room air)    Weight: 235 lbs 9.6 oz    Gen:  sitting in bed in no extremis  Neuro:  alert, conversant  CV:  nl rate, regular rhythm  Pulm:  no acute resp distress, ctab anteriorly  MSK:  significant interval improvement in LUE erythema and swelling at the left elbow    Medical Decision Making             Data   Reviewed:    WBC 17  Hgb 14  Plts 205

## 2025-05-06 NOTE — ANESTHESIA PROCEDURE NOTES
Brachial plexus Procedure Note    Pre-Procedure   Staff -        Anesthesiologist:  Dwayne Dixon MD       Performed By: anesthesiologist       Location: pre-op       Procedure Start/Stop Times: 5/6/2025 3:13 PM and 5/6/2025 3:15 PM       Pre-Anesthestic Checklist: patient identified, IV checked, site marked, risks and benefits discussed, informed consent, monitors and equipment checked, pre-op evaluation, at physician/surgeon's request and post-op pain management  Timeout:       Correct Patient: Yes        Correct Procedure: Yes        Correct Site: Yes        Correct Position: Yes        Correct Laterality: Yes        Site Marked: Yes  Procedure Documentation  Procedure: Brachial plexus         Laterality: left       Patient Position: supine       Patient Prep/Sterile Barriers: sterile gloves, mask       Skin prep: Chloraprep (infra-clavicular approach).       Needle Type: short bevel       Needle Gauge: 20.        Needle Length (Inches): 4        Ultrasound guided       1. Ultrasound was used to identify targeted nerve, plexus, vascular marker, or fascial plane and place a needle adjacent to it in real-time.       2. Ultrasound was used to visualize the spread of anesthetic in close proximity to the above referenced structure.       3. A permanent image is entered into the patient's record.       4. The visualized anatomic structures appeared normal.       5. There were no apparent abnormal pathologic findings.    Assessment/Narrative         The placement was negative for: blood aspirated, painful injection and site bleeding       Paresthesias: No.       Bolus given via needle. no blood aspirated via catheter.        Secured via.        Insertion/Infusion Method: Single Shot       Complications: none       Injection made incrementally with aspirations every 5 mL.    Medication(s) Administered   Bupivacaine 0.5% PF (Infiltration) - Infiltration   30 mL - 5/6/2025 3:15:00 PM  Medication Administration  "Time: 5/6/2025 3:13 PM      FOR [facility] ONLY:   Pain Team Contact information: please page the Pain Team Via [facility]. Search \"Pain\". During daytime hours, please page the attending first. At night please page the resident first.      "

## 2025-05-06 NOTE — CONSULTS
ORTHOPEDIC CONSULTATION    Consultation  Balaji Jc,  1965, MRN [id number]    [place]  Cellulitis of left upper extremity  Cellulitis    PCP: Gaye, [facility], [phone]   Code status:  Full Code       Extended Emergency Contact Information  Primary Emergency Contact: YU JC  Mobile Phone: [phone]  Relation: Spouse         IMPRESSION:  Cellulitis of the left upper extremity      PLAN:  This patient was discussed with Dr. Coleman, on-call surgeon for [facility] and they are in agreement with the following plan.     - MRI shows developing abscess  - NPO surgery at 3pm for washout  - Pain control  - Antibiotics per medicine would not hesitate for ID consult pending OR cultures    Thank you for including [facility] in the care of Balaji Jc. It has been a pleasure participating in balaji' care.        CHIEF COMPLAINT: <principal problem not specified>    HISTORY OF PRESENT ILLNESS:  The patient is seen in orthopedic consultation at the request of dr. Kumar.  The patient is a 59 year old male with moderate pain of the left  elbow. The patient reports that he was using a ladder and was going down the ladder, however, he notes that he forgot that the second to last step of the ladder was missing, so he fell as he was walking down the ladder. He states that he fell down onto the soft gravel ground. This fall occurred this past Friday (on 2025). Notes that he was not in any pain and nothing was bothering him after he fell that day, hence he continued to work. He reports that he was feeling fine until today (2025) noting that he started experiencing pain all of a sudden today. His left arm appears very swollen and tender. He indicates that he began having slight pain radiating up starting from his left elbow.      Per RN report, in triage the patient mentioned that his left arm began to hurt after this fall, so he went in on  and had an x-ray  which was negative for a fracture. Yesterday (05/04/2025) at 3 PM, the patient's left arm was sore, tingling and he had a fever. This morning, his arm was warm, red and swollen when he woke up. He mentions that it hurts 7/10 if the arm is touched. No additional complaints or concerns reported at this time.    PAST MEDICAL HISTORY:  No past medical history on file.       ALLERGIES:   Review of patient's allergies indicates No Active Allergies      MEDICATIONS UPON ADMISSION:  Medications were reviewed.  They include:   Medications Prior to Admission   Medication Sig Dispense Refill Last Dose/Taking    aspirin 81 MG EC tablet Take 81 mg by mouth daily.   5/5/2025 Morning    atenolol (TENORMIN) 25 MG tablet Take 25 mg by mouth daily.   5/5/2025 Morning    atorvastatin (LIPITOR) 80 MG tablet Take 40 mg by mouth daily.   5/5/2025 Morning    loratadine (CLARITIN) 10 MG tablet Take 10 mg by mouth daily as needed for allergies.   5/4/2025 Morning    naproxen sodium 220 MG capsule Take 220 mg by mouth 2 times daily as needed (pain).   5/4/2025 Morning    sildenafil (VIAGRA) 25 MG tablet Take 25 mg by mouth See Admin Instructions. Take as directed by your prescriber.   5/3/2025         SOCIAL HISTORY:   he      FAMILY HISTORY:  family history is not on file.      REVIEW OF SYSTEMS:   Reviewed with patient. See HPI, otherwise negative       PHYSICAL EXAMINATION:  Vitals: Temp:  [98.5  F (36.9  C)-100.1  F (37.8  C)] 98.9  F (37.2  C)  Pulse:  [77-98] 85  Resp:  [16-20] 18  BP: (111-183)/(65-98) 126/74  SpO2:  [93 %-98 %] 96 %  General: On examination, the patient is resting comfortably, NAD, awake, and alert and oriented to person, place, time, and, and general circumstances   SKIN: There is moderate erythema over the left elbow extending into the volar forearm and up into the proximal upper arm. No streaking into the wrist or armpit. There is induration and fluctuance over the left elbow and olecranon bursa.  Pulses:  radial  "pulse is intact and equal bilaterally  Sensation: intact and equal bilaterally to the distal upper extremities.  Tenderness: tenderness over the olecranon extending into the volar forearm.  ROM: able to bend and extend the elbow with some difficulty  Motor: 5/5  Contralateral side= Full range of motion, Negative joint instability findings, 5/5 motor groups about the joint, Non-tender.       RADIOGRAPHIC EVALUATION:  Personally reviewed    PERTINENT LABS:  Lab Results: personally reviewed.   @BRIEFLAB[NA,K,CL,CO2,BUN,CREATININE,GLUCOSE,GLUCOSE @  Lab Results   Component Value Date    WBC 16.6 05/06/2025    HGB 13.6 05/06/2025    HCT 39.6 05/06/2025    MCV 94 05/06/2025     05/06/2025       Clinically Significant Risk Factors Present on Admission      # Drug Induced Platelet Defect: home medication list includes an antiplatelet medication    # Obesity: Estimated body mass index is 34.79 kg/m  as calculated from the following:    Height as of this encounter: 1.753 m (5' 9\").    Weight as of this encounter: 106.9 kg (235 lb 9.6 oz).      #CRP includes abnormal value:   Recent labs: (last 2 weeks)     05/05/25  1326   CRPI 154.10*     #WBC includes abnormal value:  Recent labs: (last 2 weeks)     05/05/25  1326 05/06/25  0711   WBC 20.5* 16.6*     Risk Factors for Delirium:  - Pt has elevated WBC >11 within the past 24 hours.        Jazmyn Younger PA-C, EMMA  Date: 5/6/2025  Time: 10:49 AM    CC1:   Donis Kumar MD    CC2:   [facility]   "

## 2025-05-06 NOTE — PLAN OF CARE
Goal Outcome Evaluation:           Overall Patient Progress: improvingOverall Patient Progress: improving    Outcome Evaluation: POD#0, denies pain, reports numbness from block, can weakly move fingers, good cap refill. drsg intact, arm in sling, ice applied. Tolerating regular diet.       No

## 2025-05-06 NOTE — OP NOTE
Surgeon: Marisa Coleman M.D.     Assistant: Luh Nieto PA-C     Preoperative diagnosis: Left elbow septic bursitis     Postoperative diagnosis: Left elbow septic bursitis    Operation/procedures performed: Left olecranon bursitis irrigation and debridement.     Sedation/anesthesia: Left upper extremity regional block supplemented with MAC sedation     Complications: None     Drains: Small round drain placed in left elbow     Estimated blood loss: 10 cc     Implants: None     Specimens: Fluid obtained from the left olecranon bursa was sent for:   1.  Stat Gram stain.  2.  Aerobic and anaerobic culture.     Indications for Surgery: The patient is a 59-year-old gentleman who presented with left elbow erythema, pain, and swelling which began on 5/4/2025 and significantly worsened on 5/5/2025.  The patient reports that on 5/2/2025, he fell while walking down a ladder the ladder.  He missed the second to last step (which was missing) and he fell onto soft gravel ground.  He did not notice any abrasion after the fall and had no pain until 5/4/2025 when he started to feel sick.  He had no significant pain in his elbow on 5/4/2025.  His elbow pain and swelling did not start until the following day.  He presented to [facility] where it was felt he had a severe cellulitis and he was referred for admission.  The patient has been on IV antibiotics and he states that the pain and swelling in his left arm has greatly improved.  However, an MRI performed on 5/5/2025 demonstrated a cellulitis with fluid collection along the dorsal aspect of the proximal ulna with peripheral surrounding enhancement concerning for developing abscess.  Clinically, the patient has fluctuance over the proximal olecranon consistent with a bursitis.  Based on the patient's history, clinical exam, and MRI findings, I believe the patient likely has a septic bursitis of the left elbow joint.  I recommended doing an left olecranon  "irrigation and  debridement.  The risks, benefits, and alternatives of surgical intervention were thoroughly discussed with the patient and his wife.  Risks of surgery include but are not limited to the risks of anesthesia, infection, neurovascular injury, nonunion, nonunion, and/or the development of scar tissue.  Consequences of such adverse events may result in loss of life, loss of limb, the need for additional surgery, chronic pain, loss of motion and/or loss of strength.  The patient had an opportunity to ask questions which I answered.  They verbalized understanding and agreed with the plan as outlined above.     Disposition: Recovery room in good condition     Operation description: The patient was identified, informed consent was obtained, and the surgical site was marked in the holding area.  The anesthesiologist administered a left regional block.  The patient was brought to the operating room and positioned supine with the left upper extremity on an arm table.  The anesthetist then administered general anesthesia.     The patient's left upper extremity was then prepped and draped in standard surgical fashion.  This included placing a well-padded tourniquet on the upper arm.  Prior to beginning the case a \"timeout\" was performed by the surgical team to confirm surgical site, side, and procedure..  The left upper extremity was elevated for several minutes and then the tourniquet was inflated to 250 mmHg.     I made a 5 cm curvilinear incision over the left olecranon.  The incision was made with a 15 blade through skin only.  Then dissected through the soft tissue.  Bovie cautery was used to cauterize the small crossing vessels.      Beneath the subcutaneous tissue, there was a bursal sac filled with fluid.  The fluid tracked from the tip of the olecranon down distally over the proximal ulna.  The bursal sac was opened and evacuated of fluid.  There was approximately 20 cc of fluid in the bursal sac.  The " bursal fluid appeared cloudy in nature but was not grossly purulent.  I swabbed the fluid over the olecranon and sent the swab for anaerobic and aerobic culture as well as stat Gram stain.  I swabbed the distal fluid over the olecranon.  This swab was sent for stat Gram stain, aerobic culture and anaerobic culture.  The posterior bursa and the subcutaneous tissues were then copiously irrigated  using 3 L of normal saline impregnated with 3 g of Ancef.       A quarter inch Penrose drain was placed into the bursal pocket.  The drain exited at the proximal extent of the incision.  The skin incision was closed around the drain with interrupted 3-0 nylon placed in a horizontal mattress fashion.  The incision was sterilely dressed and the patient was placed into a bulky Nash dressing.  Patient was then awoken from anesthesia.  The patient was transferred out of the OR in good condition.     POSTOPERATIVE PLAN: I have recommended getting an ID consult.  We will keep the drain in place for 24-36 hours.  I would like to see the patient back in my office in approximately 1 week from discharge.

## 2025-05-06 NOTE — PLAN OF CARE
PRIMARY DIAGNOSIS: Cellulitis  OUTPATIENT/OBSERVATION GOALS TO BE MET BEFORE DISCHARGE:  ADLs back to baseline: Yes    Activity and level of assistance: Ambulating independently.    Pain status: Improved-controlled with oral pain medications.    Return to near baseline physical activity: Yes         Entered by: Karine Rojo RN 05/06/2025      Please review provider order for any additional goals.   Nurse to notify provider when observation goals have been met and patient is ready for discharge.

## 2025-05-06 NOTE — ANESTHESIA CARE TRANSFER NOTE
Patient: Balaji Jc    Procedure: Procedure(s):  IRRIGATION AND DEBRIDEMENT, LEFT OLECRANON BURSA       Diagnosis: Cellulitis of left upper extremity [L03.114]  Diagnosis Additional Information: No value filed.    Anesthesia Type:   General     Note:    Oropharynx: oropharynx clear of all foreign objects  Level of Consciousness: awake  Oxygen Supplementation: face mask  Level of Supplemental Oxygen (L/min / FiO2): 6  Independent Airway: airway patency satisfactory and stable  Dentition: dentition unchanged  Vital Signs Stable: post-procedure vital signs reviewed and stable  Report to RN Given: handoff report given  Patient transferred to: PACU    Handoff Report: Identifed the Patient, Identified the Reponsible Provider, Reviewed the pertinent medical history, Discussed the surgical course, Reviewed Intra-OP anesthesia mangement and issues during anesthesia, Set expectations for post-procedure period and Allowed opportunity for questions and acknowledgement of understanding      Vitals:  Vitals Value Taken Time   /70 05/06/25 1640   Temp 30.7  C (87.26  F) 05/06/25 1641   Pulse 70 05/06/25 1641   Resp 27 05/06/25 1641   SpO2 97 % 05/06/25 1641   Vitals shown include unfiled device data.    Electronically Signed By: MABEL Villagran CRNA  May 6, 2025  4:43 PM

## 2025-05-06 NOTE — PLAN OF CARE
PRIMARY DIAGNOSIS: Cellulitis  OUTPATIENT/OBSERVATION GOALS TO BE MET BEFORE DISCHARGE:  ADLs back to baseline: Yes    Activity and level of assistance: Ambulating independently.    Pain status: Improved-controlled with oral pain medications.    Return to near baseline physical activity: Yes         Entered by: Karine Rojo RN 05/06/2025      Please review provider order for any additional goals.   Nurse to notify provider when observation goals have been met and patient is ready for discharge.    A&Ox4,  Vitals: Temp: 98.9  F (37.2  C) Temp src: Oral BP: 119/71 Pulse: 79   Resp: 18 SpO2: 94 % O2 Device: None (Room air)     Diet:Orders Placed This Encounter      NPO for Medical/Clinical Reasons Except for: Meds  INDP  Discharge pending.

## 2025-05-06 NOTE — ANESTHESIA PREPROCEDURE EVALUATION
Anesthesia Pre-Procedure Evaluation    Patient: Balaji Jc   MRN: [id number] : 1965        Procedure : Procedure(s):  IRRIGATION AND DEBRIDEMENT, Elbow          No past medical history on file.   History reviewed. No pertinent surgical history.   No Active Allergies   Social History     Tobacco Use    Smoking status: Not on file    Smokeless tobacco: Not on file   Substance Use Topics    Alcohol use: Not on file      Wt Readings from Last 1 Encounters:   25 106.9 kg (235 lb 9.6 oz)        Anesthesia Evaluation   Pt has had prior anesthetic.     No history of anesthetic complications       ROS/MED HX  ENT/Pulmonary:  - neg pulmonary ROS     Neurologic:       Cardiovascular:  - neg cardiovascular ROS     METS/Exercise Tolerance:     Hematologic:  - neg hematologic  ROS     Musculoskeletal:       GI/Hepatic:  - neg GI/hepatic ROS     Renal/Genitourinary:  - neg Renal ROS     Endo:  - neg endo ROS   (+)               Obesity,       Psychiatric/Substance Use:       Infectious Disease: Comment: Left elbow cellulitis following a fall from a ladder & cut from sheet metal      Malignancy:       Other:            Physical Exam    Airway        Mallampati: II   TM distance: > 3 FB   Neck ROM: full   Mouth opening: > 3 cm    Respiratory Devices and Support         Dental       (+) Minor Abnormalities - some fillings, tiny chips      Cardiovascular          Rhythm and rate: regular and normal     Pulmonary           breath sounds clear to auscultation           OUTSIDE LABS:  CBC:   Lab Results   Component Value Date    WBC 16.6 (H) 2025    WBC 20.5 (H) 2025    HGB 13.6 2025    HGB 14.2 2025    HCT 39.6 (L) 2025    HCT 41.9 2025     2025     2025     BMP:   Lab Results   Component Value Date     2025     2025    POTASSIUM 4.3 2025    POTASSIUM 3.8 2025    CHLORIDE 106 2025    CHLORIDE 104 2025    CO2  "22 05/06/2025    CO2 22 05/05/2025    BUN 10.9 05/06/2025    BUN 11.1 05/05/2025    CR 1.09 05/06/2025    CR 1.07 05/05/2025     (H) 05/06/2025     (H) 05/05/2025     COAGS: No results found for: \"PTT\", \"INR\", \"FIBR\"  POC: No results found for: \"BGM\", \"HCG\", \"HCGS\"  HEPATIC:   Lab Results   Component Value Date    ALBUMIN 4.4 02/09/2024    PROTTOTAL 7.3 02/09/2024    ALT 46 02/09/2024    AST 34 02/09/2024    ALKPHOS 88 02/09/2024    BILITOTAL 0.6 02/09/2024     OTHER:   Lab Results   Component Value Date    FRAN 8.9 05/06/2025       Anesthesia Plan    ASA Status:  3    NPO Status:  NPO Appropriate    Anesthesia Type: General.     - Airway: Mask Only   Induction: Intravenous.   Maintenance: TIVA.        Consents    Anesthesia Plan(s) and associated risks, benefits, and realistic alternatives discussed. Questions answered and patient/representative(s) expressed understanding.     - Discussed: Risks, Benefits and Alternatives for the PROCEDURE were discussed     - Discussed with:  Patient, Spouse            Postoperative Care    Pain management: Peripheral nerve block (Single Shot).   PONV prophylaxis: Ondansetron (or other 5HT-3), Dexamethasone or Solumedrol     Comments:               Dwayne Dixon MD    Clinically Significant Risk Factors Present on Admission                 # Drug Induced Platelet Defect: home medication list includes an antiplatelet medication             # Obesity: Estimated body mass index is 34.79 kg/m  as calculated from the following:    Height as of this encounter: 1.753 m (5' 9\").    Weight as of this encounter: 106.9 kg (235 lb 9.6 oz).                "

## 2025-05-06 NOTE — PLAN OF CARE
PRIMARY DIAGNOSIS: SOFT TISSUE INFECTIONS  OUTPATIENT/OBSERVATION GOALS TO BE MET BEFORE DISCHARGE:  Vitals sign stable or return to baseline: Yes    Tolerating oral antibiotics or has home infusion set up if applicable: Yes    Pain status: Improved-controlled with oral pain medications.    Return to near baseline physical activity: Yes    Discharge Planner Nurse   Safe discharge environment identified: Yes  Barriers to discharge: Yes Ortho Consult       Entered by: Keri Sanabria RN 05/05/2025 10:26 PM     Please review provider order for any additional goals.     Nurse to notify provider when observation goals have been met and patient is ready for discharge.    Admitted from [place] for Cellulitis to [facility]. Area of redness outlined on arm. Receiving IV Vanco. Had MRI completed this evening. Will be NPO at midnight.

## 2025-05-06 NOTE — ANESTHESIA POSTPROCEDURE EVALUATION
Patient: Balaji Jc    Procedure: Procedure(s):  IRRIGATION AND DEBRIDEMENT, LEFT OLECRANON BURSA       Anesthesia Type:  General    Note:  Disposition: Outpatient   Postop Pain Control: Uneventful            Sign Out: Well controlled pain   PONV: No   Neuro/Psych: Uneventful            Sign Out: Acceptable/Baseline neuro status   Airway/Respiratory: Uneventful            Sign Out: Acceptable/Baseline resp. status   CV/Hemodynamics: Uneventful            Sign Out: Acceptable CV status; No obvious hypovolemia; No obvious fluid overload   Other NRE: NONE   DID A NON-ROUTINE EVENT OCCUR?            Last vitals:  Vitals Value Taken Time   /76 05/06/25 1710   Temp 34.1  C (93.38  F) 05/06/25 1656   Pulse 73 05/06/25 1713   Resp 26 05/06/25 1712   SpO2 94 % 05/06/25 1713   Vitals shown include unfiled device data.    Electronically Signed By: Marlon Marin MD  May 6, 2025  6:23 PM

## 2025-05-07 LAB
CREAT SERPL-MCNC: 0.92 MG/DL (ref 0.67–1.17)
EGFRCR SERPLBLD CKD-EPI 2021: >90 ML/MIN/1.73M2
ERYTHROCYTE [DISTWIDTH] IN BLOOD BY AUTOMATED COUNT: 13.3 % (ref 10–15)
GLUCOSE BLDC GLUCOMTR-MCNC: 152 MG/DL (ref 70–99)
HCT VFR BLD AUTO: 40.4 % (ref 40–53)
HGB BLD-MCNC: 14.1 G/DL (ref 13.3–17.7)
MCH RBC QN AUTO: 32.3 PG (ref 26.5–33)
MCHC RBC AUTO-ENTMCNC: 34.9 G/DL (ref 31.5–36.5)
MCV RBC AUTO: 92 FL (ref 78–100)
PLATELET # BLD AUTO: 234 10E3/UL (ref 150–450)
RBC # BLD AUTO: 4.37 10E6/UL (ref 4.4–5.9)
VANCOMYCIN SERPL-MCNC: 6.1 UG/ML (ref ?–25)
WBC # BLD AUTO: 17.4 10E3/UL (ref 4–11)

## 2025-05-07 PROCEDURE — 80202 ASSAY OF VANCOMYCIN: CPT | Performed by: HOSPITALIST

## 2025-05-07 PROCEDURE — 82565 ASSAY OF CREATININE: CPT | Performed by: HOSPITALIST

## 2025-05-07 PROCEDURE — 99232 SBSQ HOSP IP/OBS MODERATE 35: CPT | Performed by: HOSPITALIST

## 2025-05-07 PROCEDURE — 250N000013 HC RX MED GY IP 250 OP 250 PS 637: Performed by: PHYSICIAN ASSISTANT

## 2025-05-07 PROCEDURE — 250N000011 HC RX IP 250 OP 636: Performed by: HOSPITALIST

## 2025-05-07 PROCEDURE — 85041 AUTOMATED RBC COUNT: CPT | Performed by: HOSPITALIST

## 2025-05-07 PROCEDURE — 258N000003 HC RX IP 258 OP 636: Performed by: HOSPITALIST

## 2025-05-07 PROCEDURE — 36415 COLL VENOUS BLD VENIPUNCTURE: CPT | Performed by: HOSPITALIST

## 2025-05-07 PROCEDURE — 120N000001 HC R&B MED SURG/OB

## 2025-05-07 PROCEDURE — 250N000013 HC RX MED GY IP 250 OP 250 PS 637: Performed by: HOSPITALIST

## 2025-05-07 RX ORDER — ENOXAPARIN SODIUM 100 MG/ML
40 INJECTION SUBCUTANEOUS EVERY 24 HOURS
Status: DISCONTINUED | OUTPATIENT
Start: 2025-05-07 | End: 2025-05-08 | Stop reason: HOSPADM

## 2025-05-07 RX ORDER — ENOXAPARIN SODIUM 100 MG/ML
40 INJECTION SUBCUTANEOUS EVERY 24 HOURS
Status: DISCONTINUED | OUTPATIENT
Start: 2025-05-07 | End: 2025-05-07

## 2025-05-07 RX ORDER — CEFTRIAXONE 2 G/1
2 INJECTION, POWDER, FOR SOLUTION INTRAMUSCULAR; INTRAVENOUS EVERY 24 HOURS
Status: DISCONTINUED | OUTPATIENT
Start: 2025-05-07 | End: 2025-05-08 | Stop reason: HOSPADM

## 2025-05-07 RX ADMIN — SENNOSIDES AND DOCUSATE SODIUM 1 TABLET: 50; 8.6 TABLET ORAL at 09:16

## 2025-05-07 RX ADMIN — ATENOLOL 25 MG: 25 TABLET ORAL at 09:18

## 2025-05-07 RX ADMIN — POLYETHYLENE GLYCOL 3350 17 G: 17 POWDER, FOR SOLUTION ORAL at 09:18

## 2025-05-07 RX ADMIN — VANCOMYCIN HYDROCHLORIDE 1750 MG: 5 INJECTION, POWDER, LYOPHILIZED, FOR SOLUTION INTRAVENOUS at 09:21

## 2025-05-07 RX ADMIN — SENNOSIDES AND DOCUSATE SODIUM 2 TABLET: 50; 8.6 TABLET ORAL at 19:47

## 2025-05-07 RX ADMIN — ACETAMINOPHEN 650 MG: 325 TABLET, FILM COATED ORAL at 19:47

## 2025-05-07 RX ADMIN — CEFTRIAXONE SODIUM 2 G: 2 INJECTION, POWDER, FOR SOLUTION INTRAMUSCULAR; INTRAVENOUS at 19:47

## 2025-05-07 RX ADMIN — ENOXAPARIN SODIUM 40 MG: 40 INJECTION SUBCUTANEOUS at 09:20

## 2025-05-07 RX ADMIN — ATORVASTATIN CALCIUM 40 MG: 40 TABLET, FILM COATED ORAL at 09:16

## 2025-05-07 RX ADMIN — ASPIRIN 81 MG: 81 TABLET, COATED ORAL at 09:16

## 2025-05-07 ASSESSMENT — ACTIVITIES OF DAILY LIVING (ADL)
ADLS_ACUITY_SCORE: 23

## 2025-05-07 NOTE — PLAN OF CARE
Goal Outcome Evaluation:      Plan of Care Reviewed With: patient    Overall Patient Progress: improvingOverall Patient Progress: improving    Outcome Evaluation: denies pain, arm continues to be numb, sling in place. Dressing changed by provider this am. PIV SL'd between abx. tolerating po intake.      Dressing changed this afternoon, serous drainage from penrose drain. Arm starting to feel tingly. Denies pain. Unable to effectively move arm yet, arm in sling.

## 2025-05-07 NOTE — PLAN OF CARE
Goal Outcome Evaluation:               VSS. Patient reports no pain, reports numbness from block with sensation returning in upper arm.  Up independently.              Problem: Adult Inpatient Plan of Care  Goal: Optimal Comfort and Wellbeing  Outcome: Progressing     Problem: Skin or Soft Tissue Infection  Goal: Absence of Infection Signs and Symptoms  Outcome: Progressing     Problem: Pain Acute  Goal: Optimal Pain Control and Function  Outcome: Progressing

## 2025-05-07 NOTE — PROGRESS NOTES
"Orthopedic Progress Note      Assessment: 1 Day Post-Op  S/P Procedure(s):  IRRIGATION AND DEBRIDEMENT, LEFT OLECRANON BURSA @    Plan:   - Continue PT/OT  - Weightbearing status: PWB as tolerated when block wears off  - Anticoagulation: per medicine in addition to SCDs, zackary stockings and early ambulation.  - Penrose drain to be removed tomorrow.   - dressing change daily and as needed for saturation  - pain control  - Discharge planning: pending cultures and plan      Subjective:  Pain: block still in place  Chest pain, SOB: no  Nausea, Vomiting:  no  Lightheadedness, Dizziness:  no  Neuro:  Patient denies new onset numbness or paresthesias    Patient is doing well this morning, block is still in place so pain is a 0 at this time. No new concerns overnight    Objective:  /83 (BP Location: Right arm)   Pulse 76   Temp 98.7  F (37.1  C) (Oral)   Resp 17   Ht 1.753 m (5' 9\")   Wt 106.9 kg (235 lb 9.6 oz)   SpO2 (!) 91%   BMI 34.79 kg/m    The patient is A&Ox3. Appears comfortable.   Sensation is intact.  Block still in place unable to move or wiggle fingers  Radial pulse intact  Calves are soft and non-tender. Negative Chani's.  The incision is covered. Dressing C/D/I.    Pertinent Labs   Lab Results: personally reviewed.   No results found for: \"INR\", \"PROTIME\"  Lab Results   Component Value Date    WBC 17.4 (H) 05/07/2025    HGB 14.1 05/07/2025    HCT 40.4 05/07/2025    MCV 92 05/07/2025     05/07/2025     Lab Results   Component Value Date     05/06/2025    CO2 22 05/06/2025         Report completed by:  Jazmyn Younger PA-C/[facility]    Date: 5/7/2025  Time: 8:44 AM    "

## 2025-05-07 NOTE — PHARMACY-VANCOMYCIN DOSING SERVICE
Pharmacy Vancomycin Note  Date of Service May 7, 2025  Patient's  1965   59 year old, male    Indication: Skin and Soft Tissue Infection  Day of Therapy: 3  Current vancomycin regimen:  1000 mg IV q12h  Current vancomycin monitoring method: AUC  Current vancomycin therapeutic monitoring goal: 400-600 mg*h/L    [employer] Prediction of Current Vancomycin Regimen  Loading dose: 2000  Regimen: 1000 mg IV every 12 hours.  Start time: 07:51 on 2025  Exposure target: AUC24 (range)400-600 mg/L.hr   AUC24,ss: 263 mg/L.hr  Probability of AUC24 > 400: 1 %  Ctrough,ss: 5.8 mg/L  Probability of Ctrough,ss > 20: 0 %  Probability of nephrotoxicity (Lodise JOURDAN ): 4 %      Current estimated CrCl = Estimated Creatinine Clearance: 104.2 mL/min (based on SCr of 0.92 mg/dL).    Creatinine for last 3 days  2025:  1:26 PM Creatinine 1.07 mg/dL  2025:  7:11 AM Creatinine 1.09 mg/dL  2025:  6:06 AM Creatinine 0.92 mg/dL    Recent Vancomycin Levels (past 3 days)  2025:  6:06 AM Vancomycin 6.1 ug/mL    Vancomycin IV Administrations (past 72 hours)                     vancomycin (VANCOCIN) 1,000 mg in NaCl 0.9% 200 mL intermittent infusion (mg) 1,000 mg Given 25 1925     1,000 mg Given  0835    vancomycin (VANCOCIN) 2,000 mg in 0.9% NaCl 520 mL intermittent infusion (mg) 2,000 mg New Bag 25 1958                    Nephrotoxins and other renal medications (From now, onward)      Start     Dose/Rate Route Frequency Ordered Stop    25 0800  vancomycin (VANCOCIN) 1,750 mg in 0.9% NaCl 517.5 mL intermittent infusion         1,750 mg  over 2 Hours Intravenous EVERY 12 HOURS 25 0755                 Contrast Orders - past 72 hours (72h ago, onward)      Start     Dose/Rate Route Frequency Stop    25 2200  gadobutrol (GADAVIST) injection 10 mL         10 mL Intravenous ONCE 25 2157            Interpretation of levels and current regimen:  Vancomycin level is reflective of AUC less  than 400    Has serum creatinine changed greater than 50% in last 72 hours: No    Urine output:  good urine output    Renal Function: Stable    [employer] Prediction of Planned New Vancomycin Regimen    Regimen: 1750 mg IV every 12 hours.  Start time: 07:51 on 05/07/2025  Exposure target: AUC24 (range)400-600 mg/L.hr   AUC24,ss: 459 mg/L.hr  Probability of AUC24 > 400: 80 %  Ctrough,ss: 10.5 mg/L  Probability of Ctrough,ss > 20: 0 %  Probability of nephrotoxicity (Lodise JOURDAN 2009): 6 %      Plan:  Increase Dose to 1750mg iv q12h  Vancomycin monitoring method: AUC  Vancomycin therapeutic monitoring goal: 400-600 mg*h/L  Pharmacy will check vancomycin levels as appropriate in 3-5 Days.  Serum creatinine levels will be ordered daily for the first week of therapy and at least twice weekly for subsequent weeks.    Cem Freitas [facility]

## 2025-05-07 NOTE — PROGRESS NOTES
"[facility]    Medicine Progress Note - Hospitalist Service    Date of Admission:  5/5/2025    Assessment & Plan   Balaji Jc is a 59 year old male admitted with LUE cellulitis and septic bursitis.  He is clinically stable.  Continue empiric vancomycin.  Hold on consultation with ID until we have sufficient culture data to further guide abx therapy.  He had clinical improvement in cellulitis on vancomycin (and one dose clindamycin) prior to surgery.  Suspect staph/strep sp as the underlying pathogen.    # LUE skin/soft tissue infection and septic bursitis  - empiric vancomycin  - cx pending    # Left hand laceration    # Ectopy  - atenolol    Addendum:  Prelim cx demonstrate GBS.  Transition to ceftriaxone, plan for ID consult tomorrow.            Diet: Advance Diet as Tolerated: Regular Diet Adult  Discharge Instruction - Regular Diet Adult      Felix Catheter: Not present  Lines: None     Cardiac Monitoring: None  Code Status: Full Code      Clinically Significant Risk Factors Present on Admission                 # Drug Induced Platelet Defect: home medication list includes an antiplatelet medication             # Obesity: Estimated body mass index is 34.79 kg/m  as calculated from the following:    Height as of this encounter: 1.753 m (5' 9\").    Weight as of this encounter: 106.9 kg (235 lb 9.6 oz).              Social Drivers of Health    Food Insecurity: High Risk (5/5/2025)    Food Insecurity     Within the past 12 months, did you worry that your food would run out before you got money to buy more?: Yes     Within the past 12 months, did the food you bought just not last and you didn t have money to get more?: No          Disposition Plan     Medically Ready for Discharge: Anticipated in 2-4 Days             Donis Kumar MD  Hospitalist Service  [facility]  Securely message with [employer] (more info)  Text page via [employer] Paging/Directory "   ______________________________________________________________________    Interval History   Left arm is numb.  He is urinating and tolerating a diet.    Physical Exam   Vital Signs: Temp: 97.8  F (36.6  C) Temp src: Oral BP: 118/82 Pulse: 76   Resp: 20 SpO2: 94 % O2 Device: None (Room air) Oxygen Delivery: 8 LPM  Weight: 235 lbs 9.6 oz    Gen:  sitting in bed in no extremis  Neuro:  alert, conversant; unable to move fingers in left hand  CV:  nl rate, regular rhythm  Pulm:  no acute resp distress, ctab anteriorly  GI:  abdomen NTTP    Medical Decision Making             Data   Reviewed:    Cr 0.92    WBC 17  Hgb 14  Plts 234

## 2025-05-08 ENCOUNTER — ENROLLMENT (OUTPATIENT)
Dept: HOME HEALTH SERVICES | Facility: HOME HEALTH | Age: 60
End: 2025-05-08
Payer: COMMERCIAL

## 2025-05-08 VITALS
HEIGHT: 69 IN | DIASTOLIC BLOOD PRESSURE: 84 MMHG | OXYGEN SATURATION: 97 % | HEART RATE: 67 BPM | RESPIRATION RATE: 17 BRPM | SYSTOLIC BLOOD PRESSURE: 132 MMHG | TEMPERATURE: 98.1 F | WEIGHT: 235.6 LBS | BODY MASS INDEX: 34.9 KG/M2

## 2025-05-08 LAB
BACTERIA WND CULT: ABNORMAL
BACTERIA WND CULT: ABNORMAL
BACTERIA WND CULT: NORMAL
BACTERIA WND CULT: NORMAL
BASOPHILS # BLD AUTO: 0.1 10E3/UL (ref 0–0.2)
BASOPHILS NFR BLD AUTO: 1 %
CRP SERPL-MCNC: 34.8 MG/L
EOSINOPHIL # BLD AUTO: 0.3 10E3/UL (ref 0–0.7)
EOSINOPHIL NFR BLD AUTO: 3 %
ERYTHROCYTE [DISTWIDTH] IN BLOOD BY AUTOMATED COUNT: 13.6 % (ref 10–15)
GLUCOSE BLDC GLUCOMTR-MCNC: 116 MG/DL (ref 70–99)
HCT VFR BLD AUTO: 42.7 % (ref 40–53)
HGB BLD-MCNC: 14.6 G/DL (ref 13.3–17.7)
IMM GRANULOCYTES # BLD: 0.2 10E3/UL
IMM GRANULOCYTES NFR BLD: 2 %
LYMPHOCYTES # BLD AUTO: 2.9 10E3/UL (ref 0.8–5.3)
LYMPHOCYTES NFR BLD AUTO: 26 %
MCH RBC QN AUTO: 32 PG (ref 26.5–33)
MCHC RBC AUTO-ENTMCNC: 34.2 G/DL (ref 31.5–36.5)
MCV RBC AUTO: 94 FL (ref 78–100)
MONOCYTES # BLD AUTO: 0.9 10E3/UL (ref 0–1.3)
MONOCYTES NFR BLD AUTO: 9 %
NEUTROPHILS # BLD AUTO: 6.7 10E3/UL (ref 1.6–8.3)
NEUTROPHILS NFR BLD AUTO: 60 %
NRBC # BLD AUTO: 0 10E3/UL
NRBC BLD AUTO-RTO: 0 /100
PLATELET # BLD AUTO: 249 10E3/UL (ref 150–450)
RBC # BLD AUTO: 4.56 10E6/UL (ref 4.4–5.9)
WBC # BLD AUTO: 11.1 10E3/UL (ref 4–11)

## 2025-05-08 PROCEDURE — 250N000011 HC RX IP 250 OP 636: Performed by: HOSPITALIST

## 2025-05-08 PROCEDURE — 250N000013 HC RX MED GY IP 250 OP 250 PS 637: Performed by: HOSPITALIST

## 2025-05-08 PROCEDURE — 99222 1ST HOSP IP/OBS MODERATE 55: CPT | Performed by: INTERNAL MEDICINE

## 2025-05-08 PROCEDURE — 36415 COLL VENOUS BLD VENIPUNCTURE: CPT | Performed by: PHYSICIAN ASSISTANT

## 2025-05-08 PROCEDURE — 85025 COMPLETE CBC W/AUTO DIFF WBC: CPT | Performed by: PHYSICIAN ASSISTANT

## 2025-05-08 PROCEDURE — 99238 HOSP IP/OBS DSCHRG MGMT 30/<: CPT | Performed by: HOSPITALIST

## 2025-05-08 PROCEDURE — 250N000013 HC RX MED GY IP 250 OP 250 PS 637: Performed by: PHYSICIAN ASSISTANT

## 2025-05-08 PROCEDURE — 86140 C-REACTIVE PROTEIN: CPT | Performed by: PHYSICIAN ASSISTANT

## 2025-05-08 RX ORDER — AMOXICILLIN 500 MG/1
500 CAPSULE ORAL 3 TIMES DAILY
Qty: 33 CAPSULE | Refills: 0 | Status: SHIPPED | OUTPATIENT
Start: 2025-05-08 | End: 2025-05-19

## 2025-05-08 RX ADMIN — POLYETHYLENE GLYCOL 3350 17 G: 17 POWDER, FOR SOLUTION ORAL at 08:27

## 2025-05-08 RX ADMIN — ASPIRIN 81 MG: 81 TABLET, COATED ORAL at 08:28

## 2025-05-08 RX ADMIN — ACETAMINOPHEN 650 MG: 325 TABLET, FILM COATED ORAL at 08:33

## 2025-05-08 RX ADMIN — ATENOLOL 25 MG: 25 TABLET ORAL at 08:28

## 2025-05-08 RX ADMIN — ATORVASTATIN CALCIUM 40 MG: 40 TABLET, FILM COATED ORAL at 08:28

## 2025-05-08 RX ADMIN — SENNOSIDES AND DOCUSATE SODIUM 1 TABLET: 50; 8.6 TABLET ORAL at 08:28

## 2025-05-08 ASSESSMENT — ACTIVITIES OF DAILY LIVING (ADL)
ADLS_ACUITY_SCORE: 23

## 2025-05-08 NOTE — PROGRESS NOTES
"Orthopedic Progress Note      Assessment: 2 Day Post-Op  S/P Procedure(s):  IRRIGATION AND DEBRIDEMENT, LEFT OLECRANON BURSA @    Plan:   - Continue PT/OT  - Weightbearing status: PWB as tolerated   - Anticoagulation: per medicine in addition to SCDs, zackary stockings and early ambulation.  - Penrose drain removed  - dressing change daily and as needed for saturation  - pain control  - Cultures with Group B strep  - Discharge planning: ID plan      Subjective:  Pain: block still in place  Chest pain, SOB: no  Nausea, Vomiting:  no  Lightheadedness, Dizziness:  no  Neuro:  Patient denies new onset numbness or paresthesias    Patient is doing well this morning, block has worn off and able to fully move his arm today and notes that his pain is much improved since prior to surgery    Objective:  /84 (BP Location: Right arm)   Pulse 67   Temp 98.1  F (36.7  C) (Oral)   Resp 17   Ht 1.753 m (5' 9\")   Wt 106.9 kg (235 lb 9.6 oz)   SpO2 97%   BMI 34.79 kg/m    The patient is A&Ox3. Appears comfortable.   Sensation is intact.  Able to wiggle fingers, give okay sign and thumbs up.   Radial pulse intact  Calves are soft and non-tender. Negative Chani's.  The incision is covered. Dressing C/D/I.    Pertinent Labs   Lab Results: personally reviewed.   No results found for: \"INR\", \"PROTIME\"  Lab Results   Component Value Date    WBC 11.1 (H) 05/08/2025    HGB 14.6 05/08/2025    HCT 42.7 05/08/2025    MCV 94 05/08/2025     05/08/2025     Lab Results   Component Value Date     05/06/2025    CO2 22 05/06/2025         Report completed by:  Jazmyn Younger PA-C/Dr. Coleman  [facility]  05/08/25     "

## 2025-05-08 NOTE — PROGRESS NOTES
Pt transferred to  at 1408. A&Ox4, VSS. Planning to discharge this evening if going home on oral abx. Moves ind and ace wrap on L elbow. Denies pain.

## 2025-05-08 NOTE — PLAN OF CARE
Patient is alert and oriented. He is up independently, voiding spontaneously. Pain is well controlled on oral PRN medications. Drain removed. Left elbow is covered. Minimal drainage. IV SL. VSS.      Problem: Adult Inpatient Plan of Care  Goal: Absence of Hospital-Acquired Illness or Injury  Intervention: Prevent Skin Injury  Recent Flowsheet Documentation  Taken 5/8/2025 0833 by Kiera Garces, RN  Body Position: position changed independently  Intervention: Prevent Infection  Recent Flowsheet Documentation  Taken 5/8/2025 0833 by Kiera Garecs RN  Infection Prevention: hand hygiene promoted     Problem: Skin or Soft Tissue Infection  Goal: Absence of Infection Signs and Symptoms  Outcome: Progressing  Intervention: Minimize and Manage Infection Progression  Recent Flowsheet Documentation  Taken 5/8/2025 0833 by Kiera Garces RN  Infection Prevention: hand hygiene promoted     Problem: Pain Acute  Goal: Optimal Pain Control and Function  Outcome: Progressing

## 2025-05-08 NOTE — PROGRESS NOTES
Pt A&O. On room air. ACE wrap to L elbow remains in place. Denies pain. Discharging to home with PO antibiotics. All belongings with pt. PIV removed for discharge. Discharge instructions sent with pt.

## 2025-05-08 NOTE — PROGRESS NOTES
"[facility]    Medicine Progress Note - Hospitalist Service    Date of Admission:  5/5/2025    Assessment & Plan   Balaji Jc is a 59 year old male admitted with LUE cellulitis and septic bursitis.  He is clinically stable, afebrile.  Transitioned to ceftriaxone for GBS isolated from operative cultures.  Awaiting final recs from ID re: IV vs PO for remainder of course.    # LUE skin/soft tissue infection and septic bursitis  - CTX  - ID consulted for final abx recs for discharge    # Left hand laceration    # Ectopy  - atenolol    Addendum:  ID transitioned to PO abx, therefore discharge today.          Diet: Advance Diet as Tolerated: Regular Diet Adult  Discharge Instruction - Regular Diet Adult      Felix Catheter: Not present  Lines: None     Cardiac Monitoring: None  Code Status: Full Code      Clinically Significant Risk Factors                              # Obesity: Estimated body mass index is 34.79 kg/m  as calculated from the following:    Height as of this encounter: 1.753 m (5' 9\").    Weight as of this encounter: 106.9 kg (235 lb 9.6 oz)., PRESENT ON ADMISSION            Social Drivers of Health    Food Insecurity: High Risk (5/5/2025)    Food Insecurity     Within the past 12 months, did you worry that your food would run out before you got money to buy more?: Yes     Within the past 12 months, did the food you bought just not last and you didn t have money to get more?: No          Disposition Plan     Medically Ready for Discharge: Anticipated Today             Donis Kumar MD  Hospitalist Service  [facility]  Securely message with [employer] (more info)  Text page via [employer] Paging/Directory   ______________________________________________________________________    Interval History   Reports mild numbness in the left hand and forearm, which is overall improved.  Denies pain.  Denies chest pain, chest pressure, or dyspnea.    Physical Exam "   Vital Signs: Temp: 98  F (36.7  C) Temp src: Oral BP: 105/69 Pulse: 61   Resp: 16 SpO2: 95 % O2 Device: None (Room air)    Weight: 235 lbs 9.6 oz    Gen:  sitting in bed in no extremis  Neuro:  alert, conversant;  with left hand  CV:  nl rate, regular rhythm  Pulm:  no acute resp distress, ctab anteriorly  GI:  abdomen NTTP  MSK:  left arm with dressing in place    Medical Decision Making             Data

## 2025-05-08 NOTE — DISCHARGE SUMMARY
"[facility]  Hospitalist Discharge Summary      Date of Admission:  5/5/2025  Date of Discharge:  5/8/2025  Discharging Provider: Donis Kumar MD  Discharge Service: Hospitalist Service    Discharge Diagnoses   Cellulitis and left olecranon septic bursitis    Clinically Significant Risk Factors     # Obesity: Estimated body mass index is 34.79 kg/m  as calculated from the following:    Height as of this encounter: 1.753 m (5' 9\").    Weight as of this encounter: 106.9 kg (235 lb 9.6 oz).       Follow-ups Needed After Discharge   Follow-up Appointments       Follow Up Care      Follow-up with your Dr. Coleman at [facility] in 5-7 days after discharge.  Please call [phone] to schedule this.        Hospital Follow-up with Existing Primary Care Provider (PCP)          Schedule Primary Care visit within: 14 Days   Recommended labs and Imaging (to be ordered by Primary Care Provider): followup hyperglycemia               Unresulted Labs Ordered in the Past 30 Days of this Admission       Date and Time Order Name Status Description    5/6/2025  4:14 PM Wound Aerobic Bacterial Culture Routine Preliminary     5/6/2025  4:14 PM Anaerobic Bacterial Culture Routine Preliminary     5/6/2025  4:12 PM Anaerobic Bacterial Culture Routine Preliminary         These results will be followed up by PCP/ortho    Discharge Disposition   Discharged to home  Condition at discharge: Stable    Hospital Course   The patient was admitted with cellulitis and left olecranon septic bursitis.  He was treated with IV antibiotics and had I&D with orthopaedics.  GBS was isolated from intraoperative cultures.  He was transitioned to oral antibiotics in consultation with ID and discharged home.    Consultations This Hospital Stay   PHARMACY TO DOSE VANCO  ORTHOPEDIC SURGERY IP CONSULT  INFECTIOUS DISEASES IP CONSULT  INFECTIOUS DISEASES IP CONSULT  ORTHOPEDIC SURGERY IP CONSULT    Code Status   Full Code    Time Spent " "on this Encounter   I, Donis Kumar MD, personally saw the patient today and spent less than or equal to 30 minutes discharging this patient.       Donis Kumar MD  [address] [address]  Phone: [phone]  Fax: [phone]  ______________________________________________________________________    Physical Exam   Vital Signs: Temp: 98.1  F (36.7  C) Temp src: Oral BP: 132/84 Pulse: 67   Resp: 17 SpO2: 97 % O2 Device: None (Room air)    Weight: 235 lbs 9.6 oz    See progress note       Primary Care Physician   [facility]    Discharge Orders      When to call - Contact Surgeon Team    You may experience symptoms that require follow-up before your scheduled appointment. Refer to the \"Stoplight Tool\" for instructions on when to contact your Surgeon Team if you are concerned about pain control, blood clots, constipation, or if you are unable to urinate.     When to call - Reach out to Urgent Care    If you are not able to reach your Surgeon Team and you need immediate care, go to the Orthopedic Walk-in Clinic or Urgent Care at your Surgeon's office.  Do NOT go to the Emergency Room unless you have shortness of breath, chest pain, or other signs of a medical emergency.     When to call - Reasons to Call 911    Call 911 immediately if you experience sudden-onset chest pain, arm weakness/numbness, slurred speech, or shortness of breath     Discharge Instruction - Breathing exercises    Perform breathing exercises 10 times per hours while awake for 2 weeks. (If given, use your Incentive Spirometer)     Symptoms - Fever Management    A low grade fever can be expected after surgery.  Use acetaminophen (TYLENOL) as needed for fever management.  Contact your Surgeon Team if you have a fever greater than 101.5 F, chills, and/or night sweats.     Symptoms - Constipation management    Constipation (hard, dry bowel movements) is " expected after surgery due to the combination of being less active, the anesthetic, and the opioid pain medication.  You can do the following to help reduce constipation:  ~  FLUIDS:  Drink clear liquids (water or Gatorade), or juice (apple/prune).  ~  DIET:  Eat a fiber rich diet.    ~  ACTIVITY:  Get up and move around several times a day.  Increase your activity as you are able.  MEDICATIONS:  Reduce the risk of constipation by starting medications before you are constipated.  You can take Miralax   (1 packet as directed) and/or a stool softener (Senokot 1-2 tablets 1-2 times a day).  If you already have constipation and these medications are not working, you can get magnesium citrate and use as directed.  If you continue to have constipation you can try an over the counter suppository or enema.  Call your Surgeon Team if it has been greater than 3 days since your last bowel movement.     Symptoms - Reduced Urine Output    Changes in the amount of fluids you drank before and after surgery may result in problems urinating.  It is important to stay well-hydrated after surgery and drink plenty of water. If it has been greater than 8 hours since you have urinated despite drinking plenty of water, call your Surgeon Team.     Comfort and Pain Management - Pain after Surgery    Pain after surgery is normal and expected.  You will have some amount of pain for several weeks after surgery.  Your pain will improve with time.  There are several things you can do to help reduce your pain including: rest, ice, elevation, and using pain medications as needed. Contact your Surgeon Team if you have pain that persists or worsens after surgery despite rest, ice, elevation, and taking your medication(s) as prescribed. Contact your Surgeon Team if you have new numbness, tingling, or weakness in your operative extremity.     Comfort and Pain Management - Swelling after Surgery    Swelling and/or bruising of the surgical extremity is  common and may persist for several months after surgery. In addition to frequent icing and elevation, gentle compressive support with an ACE wrap or tubigrip may help with swelling. Apply compression regularly, removing at least twice daily to perform skin checks. Contact your Surgeon Team if your swelling increases and is NOT associated with an increase in your activity level, or if your swelling increases and is associated with redness and pain.     Comfort and Pain Management - Cold therapy    Ice can be used to control swelling and discomfort after surgery. Place a thin towel over your operative site and apply the ice pack overtop. Leave ice pack in place for 20 minutes, then remove for 20 minutes. Repeat this 20 minutes on/20 minutes off routine as often as tolerated.     Medication Instructions - Acetaminophen (TYLENOL) Instructions    You were discharged with acetaminophen (TYLENOL) for pain management after surgery. Acetaminophen most effectively manages pain symptoms when it is taken on a schedule without missing doses (every four, six, or eight hours). Your Provider will prescribe a safe daily dose between 3000 - 4000 mg.  Do NOT exceed this daily dose. Most patients use acetaminophen for pain control for the first four weeks after surgery.  You can wean from this medication as your pain decreases.     Medication Instructions - NSAID Instructions    You were discharged with an anti-inflammatory medication for pain management to use in combination with acetaminophen (TYLENOL) and the narcotic pain medication.  Take this medication exactly as directed.  You should only take one anti-inflammatory at a time.  Some common anti-inflammatories include: ibuprofen (ADVIL, MOTRIN), naproxen (ALEVE, NAPROSYN), celecoxib (CELEBREX), meloxicam (MOBIC), ketorolac (TORADOL).  Take this medication with food and water.     Follow Up Care    Follow-up with your Dr. Coleman at [facility] in 5-7 days after discharge.  Please  call [phone] to schedule this.     Comfort and Pain Management - UPPER extremity Elevation    Swelling is expected for several months after surgery. This type of swelling is usually associated with gravity and activity, and can be improved with elevation.   The best way to do this is to get your hand above your heart by sitting down, resting your elbow on a pillow or arm rest, with your hand in the air. Perform this elevation as often as possible especially for the first two weeks after surgery     Opioid Instructions (Less than 65 years)    You were discharged with an opioid medication (hydromorphone, oxycodone, hydrocodone, or tramadol). This medication should only be taken for breakthrough pain that is not controlled with acetaminophen (TYLENOL). If you rate your pain less than 3 you do not need this medication. Pain rating 0-3: You do not need this medication. Pain rating 4-6: Take 1 tablet every 4-6 hours as needed Pain rating 7-10: Take 2 tablets every 4-6 hours as needed. Do not exceed 6 tablets per day     Medication Instructions - Opioids - Tapering Instructions    In the first three days following surgery, your symptoms may warrant use of the narcotic pain medication every four to six hours as prescribed. This is normal. As your pain symptoms improve, focus your efforts on decreasing (tapering) use of narcotic medications. The most successful tapering strategy is to first, decrease the number of tablets you take every 4-6 hours to the minimum prescribed. Then, increase the amount of time between doses. For example: First, taper to   or 1 tablet every 4-6 hours. Then, taper to   or 1 tablet every 6-8 hours. Then, taper to   or 1 tablet every 8-10 hours. Then, taper to   or 1 tablet every 10-12 hours. Then, taper to   or 1 tablet at bedtime. The bedtime dose can help with comfort during sleep and is typically the last dose to be discontinued after surgery.     Return to Driving    Return to driving -  Driving is NOT permitted until directed by your provider. Under no circumstance are you permitted to drive while using narcotic pain medications.     Dressing / Wound Care - Wound    You have a clean dressing on your surgical wound. Dressing change instructions as follows: change your dressing daily until your follow-up appointment. Contact your Surgeon Team if you have increased redness, warmth around the surgical wound, and/or drainage from the surgical wound.     Dressing / Wound Care - NO Tub Bathing    Tub bathing, swimming, or any other activities that will cause your incision to be submerged in water should be avoided until allowed by your Surgeon.     Dressing Wound Care - Shower with wound/dressing NOT covered    You do not need to cover your dressing or incision in the shower, you may allow water and soap to run over top of the surgical dressing or incision. You may shower 2 days after surgery.  You are strictly prohibited from soaking or submerging the surgical wound underwater.     Reason for your hospital stay    You were admitted with an infection near your left elbow.  You had surgery to wash out the infected fluid and were treated with antibiotics.     When to contact your care team    Seek medical attention if you have any of the following: chest pain, chest pressure, difficulty breathing, persistent fevers, numbness or tingling in the left arm, or weakness in the left arm/hand.     Discharge Instruction - Regular Diet Adult    Return to your pre-surgery diet unless instructed otherwise     Diet    Follow this diet upon discharge: regular diet     Hospital Follow-up with Existing Primary Care Provider (PCP)            Significant Results and Procedures   Most Recent 3 CBC's:  Recent Labs   Lab Test 05/08/25  0839 05/07/25  0606 05/06/25  0711   WBC 11.1* 17.4* 16.6*   HGB 14.6 14.1 13.6   MCV 94 92 94    234 205     Most Recent 3 BMP's:  Recent Labs   Lab Test 05/08/25  0543 05/07/25  0606  05/07/25  0457 05/06/25  0711 05/05/25  1326 02/09/24  1335   NA  --   --   --  140 140 141   POTASSIUM  --   --   --  4.3 3.8 4.3   CHLORIDE  --   --   --  106 104 104   CO2  --   --   --  22 22 24   BUN  --   --   --  10.9 11.1 10.3   CR  --  0.92  --  1.09 1.07 1.01   ANIONGAP  --   --   --  12 14 13   FRAN  --   --   --  8.9 9.5 9.5   *  --  152* 116* 199* 124*   ,   Results for orders placed or performed during the hospital encounter of 05/05/25   MR Elbow Left w/o & w Contrast    Narrative    EXAM: MR ELBOW LEFT W/O and W CONTRAST  LOCATION: [facility]  DATE: 5/5/2025    INDICATION: Skin soft tissue infection, concern for septic arthritis vs bursitis.  COMPARISON: 05/05/2025  TECHNIQUE: Routine. Additional postgadolinium T1 sequences were obtained.  IV CONTRAST: 10 mL Gadavist    FINDINGS:     TENDONS:   -Distal biceps: No tendinopathy or tear.  -Common extensor origin: Moderate tendinosis with low-grade interstitial tearing (series 6 image 19). No discrete full-thickness tear.   -Common flexor tendon origin: No tendinopathy or tear.  -Distal triceps: Normal.     LIGAMENTS:   -Ulnar collateral ligament: Intact.   -Lateral collateral ligament complex: Partial tear of the radial collateral ligament at the humeral attachment (series 6 image 19). No discrete full-thickness component. Distal attachment is normal.    ELBOW JOINT:  -Ulnotrochlear articulation: Normal.   -Radiocapitellar articulation: Normal.   -Joint space: Small effusion. No intra-articular body.     BONES:   -No fracture or contusion.  -No osteonecrosis.  -No evidence of osteomyelitis. No aggressive appearing marrow replacing process. No intraosseous enhancement.    SOFT TISSUES:   -Extensive subcutaneous edema throughout the elbow, but most prominent posteriorly. Associated postcontrast enhancement which is suggestive of cellulitis.  -Fluid collection along the dorsal aspect of the proximal ulna with peripheral  "surrounding enhancement (series 11 image 19, series 8 image 19, series 10 image 14). This extends from the olecranon process distally into the forearm beyond the field-of-view   with the visualized portion measuring 0.7 x 2.6 x 6.6 cm. Collection contacts the deep myofascial margin without evidence of extension beyond. There is some edema and inflammation within the musculature along the posterior aspect of the proximal   forearm.  -No solid soft tissue mass.      Impression    IMPRESSION:  1.  Extensive soft tissue edema and enhancement throughout the elbow most consistent with cellulitis.  2.  Fluid collection along the dorsal aspect of the proximal ulna with peripheral surrounding enhancement. Appearance is concerning for developing abscess. This extends from the olecranon process distally into the forearm beyond the field-of-view.  3.  Moderate common extensor tendinosis with low-grade interstitial tearing.  4.  Partial tear of the radial collateral ligament at the humeral attachment, likely chronic.  5.  Small elbow joint effusion.  6.  No fracture.                   POC US Guidance Needle Placement    Narrative    Ultrasound was performed as guidance to an anesthesia procedure.  Click   \"PACS images\" hyperlink below to view any stored images.  For specific   procedure details, view procedure note authored by anesthesia.       Discharge Medications   Current Discharge Medication List        START taking these medications    Details   amoxicillin (AMOXIL) 500 MG capsule Take 1 capsule (500 mg) by mouth 3 times daily for 11 days.  Qty: 33 capsule, Refills: 0    Associated Diagnoses: Cellulitis of left upper extremity           CONTINUE these medications which have NOT CHANGED    Details   aspirin 81 MG EC tablet Take 81 mg by mouth daily.      atenolol (TENORMIN) 25 MG tablet Take 25 mg by mouth daily.      atorvastatin (LIPITOR) 80 MG tablet Take 40 mg by mouth daily.      loratadine (CLARITIN) 10 MG tablet Take " 10 mg by mouth daily as needed for allergies.      naproxen sodium 220 MG capsule Take 220 mg by mouth 2 times daily as needed (pain).      sildenafil (VIAGRA) 25 MG tablet Take 25 mg by mouth See Admin Instructions. Take as directed by your prescriber.           Allergies   No Known Allergies

## 2025-05-08 NOTE — PLAN OF CARE
Problem: Pain Acute  Goal: Optimal Pain Control and Function  Outcome: Progressing     Problem: Adult Inpatient Plan of Care  Goal: Readiness for Transition of Care  Outcome: Progressing   Goal Outcome Evaluation:      Plan of Care Reviewed With: patient

## 2025-05-08 NOTE — CONSULTS
"INFECTIOUS DISEASE CONSULT NOTE  Date: 05/08/2025     ================================================================  SUBJECTIVE    HPI  Patient sustained a fall from a ladder in 5/2/2024 landing on his left arm in addition to sustaining minor cuts on his left hand the same day.  Started 5/5 in the afternoon he developed significant pain, tingling, swelling, and erythema along this left upper extremity extending from his elbow to his hand.  He also developed a fever with a Tmax of 101.7.  He was then evaluated in orthopedic urgent care prior to be sent to the hospital for further evaluation.  He underwent a left-sided olecranon bursa I&D on 5/6/2025 with orthopedics.  Drain was placed at that time.  Drain was subsequently removed today with minimal output per patient.    Since his hospitalization the patient has been on clindamycin, cefazolin, vancomycin, and most recently ceftriaxone.  Cultures growing GBS strep agalactiae x 2.    Patient has no known history of orthopedic procedures to left upper extremity.  No significant history of infection.  No history of medication allergies.    Past Medical History  Active Ambulatory Problems     Diagnosis Date Noted    No Active Ambulatory Problems     Resolved Ambulatory Problems     Diagnosis Date Noted    No Resolved Ambulatory Problems     No Additional Past Medical History       Past Surgical History  He   has a past surgical history that includes Irrigation and debridement upper extremity, combined (Left, 5/6/2025).    Social History  he       Family History  Reviewed and non-contributory  family history is not on file.    Social Needs  Social History     Social History Narrative    Not on file       ================================================================  OBJECTIVE  /84 (BP Location: Right arm)   Pulse 67   Temp 98.1  F (36.7  C) (Oral)   Resp 17   Ht 1.753 m (5' 9\")   Wt 106.9 kg (235 lb 9.6 oz)   SpO2 97%   BMI 34.79 kg/m      Pertinent " labs  CBC RESULTS:   Recent Labs   Lab Test 05/08/25  0839   WBC 11.1*   RBC 4.56   HGB 14.6   HCT 42.7   MCV 94   MCH 32.0   MCHC 34.2   RDW 13.6           Physical Exam  Constitutional: healthy, alert, no distress, and cooperative  Head: normocephalic  Cardiovascular: appears well perfused  Respiratory: breathing comfortably on RA  Musculoskeletal: left upper extremity with bandage, visible skin markings on proximal arm without any erythema, mild swelling distal left hand  Neurologic: grossly normal CN  Psychiatric: mentation appears normal and affect normal    Imaging  MRII 5/5  IMPRESSION:   1. Extensive soft tissue edema and enhancement throughout the elbow most consistent with cellulitis.   2.  Fluid collection along the dorsal aspect of the proximal ulna with peripheral surrounding enhancement. Appearance is concerning for developing abscess. This extends from the olecranon process distally into the forearm beyond the field-of-view.   3.  Moderate common extensor tendinosis with low-grade interstitial tearing.   4.  Partial tear of the radial collateral ligament at the humeral attachment, likely chronic.   5.  Small elbow joint effusion.   6.  No fracture.          Microbiology data  5/6 Left elbow aerobic wound culture +1 Streptococcus agalactiae x2  5/6 Left elbow gram stain - no organisms seen, 3-4+ WBC  5/5 .10  5/8 CRP 34.80    I have personally reviewed the relevant laboratory, imaging, and microbiology data  ================================================================  Assessment  Balaji Jc is a 59 year old with history of nonobstructive CAD, hyperlipidemia, INGRID, ectopy, here with left-sided GBS septic olecranon bursitis and superimposed left upper extremity cellulitis. Significant improvement s/p left-sided olecranon bursa I&D on 5/6/2025 and IV antibiotics.    Impression  # Left olecranon septic bursitis  # Left upper extremity skin soft tissue  infection    ================================================================  Plan  - Amoxicillin 500 mg TID for 12 days (until 5/19)  - Ok to discharge from ID perspective    Anna Olivares MD  [facility]    Seen and discussed with Dr. Donis Garza    [facility]   Available via Epic secure chat (preferred) or [employer] paging

## 2025-05-08 NOTE — PLAN OF CARE
Problem: Pain Acute  Goal: Optimal Pain Control and Function  5/8/2025 0546 by Delia Robles RN  Outcome: Progressing     Problem: Skin or Soft Tissue Infection  Goal: Absence of Infection Signs and Symptoms  Intervention: Minimize and Manage Infection Progression  Recent Flowsheet Documentation  Taken 5/8/2025 0334 by Delia Robles, RN  Infection Prevention: hand hygiene promoted  Taken 5/7/2025 1942 by Delia Robles RN  Infection Prevention: hand hygiene promoted     Problem: Adult Inpatient Plan of Care  Goal: Readiness for Transition of Care  5/8/2025 0546 by Delia Robles RN  Outcome: Progressing   Goal Outcome Evaluation:      Plan of Care Reviewed With: patient      VSS. Pt reported 1/10 pain and received PRN tylenol x1. CWMS intact. Dressing change completed @ 2200 d/t being saturated with serosanguinous drainage. Pt is able to feel tingling in arm and also regained moderate ROM.

## 2025-05-09 LAB — BACTERIA WND CULT: ABNORMAL

## 2025-05-10 ENCOUNTER — RESULTS FOLLOW-UP (OUTPATIENT)
Dept: FAMILY MEDICINE | Facility: CLINIC | Age: 60
End: 2025-05-10

## 2025-05-13 LAB
BACTERIA WND CULT: NORMAL
BACTERIA WND CULT: NORMAL

## 2025-05-24 ENCOUNTER — HEALTH MAINTENANCE LETTER (OUTPATIENT)
Age: 60
End: 2025-05-24

## 2025-08-18 ENCOUNTER — LAB REQUISITION (OUTPATIENT)
Dept: LAB | Facility: CLINIC | Age: 60
End: 2025-08-18

## 2025-08-18 DIAGNOSIS — E78.5 HYPERLIPIDEMIA, UNSPECIFIED: ICD-10-CM

## 2025-08-18 DIAGNOSIS — Z12.5 ENCOUNTER FOR SCREENING FOR MALIGNANT NEOPLASM OF PROSTATE: ICD-10-CM

## 2025-08-18 LAB
ALBUMIN SERPL BCG-MCNC: 4.5 G/DL (ref 3.5–5.2)
ALP SERPL-CCNC: 98 U/L (ref 40–150)
ALT SERPL W P-5'-P-CCNC: 63 U/L (ref 0–70)
ANION GAP SERPL CALCULATED.3IONS-SCNC: 13 MMOL/L (ref 7–15)
AST SERPL W P-5'-P-CCNC: 49 U/L (ref 0–45)
BILIRUB SERPL-MCNC: 0.6 MG/DL
BUN SERPL-MCNC: 11.6 MG/DL (ref 8–23)
CALCIUM SERPL-MCNC: 9.9 MG/DL (ref 8.8–10.4)
CHLORIDE SERPL-SCNC: 103 MMOL/L (ref 98–107)
CHOLEST SERPL-MCNC: 155 MG/DL
CREAT SERPL-MCNC: 1.04 MG/DL (ref 0.67–1.17)
EGFRCR SERPLBLD CKD-EPI 2021: 83 ML/MIN/1.73M2
FASTING STATUS PATIENT QL REPORTED: ABNORMAL
FASTING STATUS PATIENT QL REPORTED: ABNORMAL
GLUCOSE SERPL-MCNC: 130 MG/DL (ref 70–99)
HCO3 SERPL-SCNC: 24 MMOL/L (ref 22–29)
HDLC SERPL-MCNC: 35 MG/DL
LDLC SERPL CALC-MCNC: 75 MG/DL
NONHDLC SERPL-MCNC: 120 MG/DL
POTASSIUM SERPL-SCNC: 4.8 MMOL/L (ref 3.4–5.3)
PROT SERPL-MCNC: 7.6 G/DL (ref 6.4–8.3)
PSA SERPL DL<=0.01 NG/ML-MCNC: 0.24 NG/ML (ref 0–3.5)
SODIUM SERPL-SCNC: 140 MMOL/L (ref 135–145)
TRIGL SERPL-MCNC: 224 MG/DL
TSH SERPL DL<=0.005 MIU/L-ACNC: 3.81 UIU/ML (ref 0.3–4.2)

## 2025-08-18 PROCEDURE — 80061 LIPID PANEL: CPT | Performed by: STUDENT IN AN ORGANIZED HEALTH CARE EDUCATION/TRAINING PROGRAM

## 2025-08-18 PROCEDURE — 84443 ASSAY THYROID STIM HORMONE: CPT | Performed by: STUDENT IN AN ORGANIZED HEALTH CARE EDUCATION/TRAINING PROGRAM

## 2025-08-18 PROCEDURE — G0103 PSA SCREENING: HCPCS | Performed by: STUDENT IN AN ORGANIZED HEALTH CARE EDUCATION/TRAINING PROGRAM

## 2025-08-18 PROCEDURE — 80053 COMPREHEN METABOLIC PANEL: CPT | Performed by: STUDENT IN AN ORGANIZED HEALTH CARE EDUCATION/TRAINING PROGRAM

## (undated) DEVICE — NDL 27GA 1 1/2" 1188827112

## (undated) DEVICE — SUCTION MANIFOLD NEPTUNE 2 SYS 1 PORT 702-025-000

## (undated) DEVICE — PREP CHLORAPREP 26ML TINTED HI-LITE ORANGE 930815

## (undated) DEVICE — PADDING CAST 4IN WEBRIL STRL 2502

## (undated) DEVICE — SLING ARM FOAM XL 0814-0795

## (undated) DEVICE — DRSG ABD TNDRSRB WET PRUF 8IN X 10IN STRL  9194A

## (undated) DEVICE — GLOVE BIOGEL PI ULTRATOUCH G SZ 6.5 42165

## (undated) DEVICE — GOWN LG DISP 9515

## (undated) DEVICE — SU ETHILON 3-0 PS-2 18" 1669H

## (undated) DEVICE — CUSTOM PACK UPPER EXTREMITY SOP5BUEHEC

## (undated) DEVICE — TUBING IRR LG BORE TUBE DRIP CHMBR 2 BG 94IN 313003

## (undated) DEVICE — BNDG ELASTIC 3"X5YDS UNSTERILE 6611-30

## (undated) DEVICE — SOL NACL 0.9% IRRIG 1000ML BOTTLE 2F7124

## (undated) DEVICE — PADDING CAST 3IN WEBRIL STRL 2394

## (undated) DEVICE — SYR 10ML LL W/O NDL 302995

## (undated) DEVICE — CUFF TOURN 18IN STRL DISP

## (undated) DEVICE — GLOVE UNDER INDICATOR PI SZ 7.0 LF 41670

## (undated) DEVICE — TUBE PENROSE 1/4 X 12 STRL 30414-025

## (undated) DEVICE — SOL WATER IRRIG 1000ML BOTTLE 2F7114

## (undated) DEVICE — DRSG XEROFORM 1X8"

## (undated) DEVICE — DRSG GAUZE 4X4" 3033

## (undated) DEVICE — GLOVE SURG PI ULTRA TOUCH M SZ 7 LF 42670

## (undated) DEVICE — ESU CORD BIPOLAR 12' E0512

## (undated) DEVICE — GLOVE UNDER INDICATOR PI SZ 6.5 LF 41665